# Patient Record
Sex: FEMALE | Race: WHITE | Employment: FULL TIME | URBAN - METROPOLITAN AREA
[De-identification: names, ages, dates, MRNs, and addresses within clinical notes are randomized per-mention and may not be internally consistent; named-entity substitution may affect disease eponyms.]

---

## 2017-01-22 DIAGNOSIS — Z30.011 ENCOUNTER FOR INITIAL PRESCRIPTION OF CONTRACEPTIVE PILLS: Primary | ICD-10-CM

## 2017-01-22 NOTE — TELEPHONE ENCOUNTER
norgestimate-ethinyl estradiol (ORTHO-CYCLEN, SPRINTEC) 0.25-35 MG-MCG per tablet      Last Written Prescription Date: 02-  Last Fill Quantity: 84,  # refills: 3   Last Office Visit with FMFABIOLA, NAN or [facility] prescribing provider: 11-

## 2017-01-23 RX ORDER — NORGESTIMATE AND ETHINYL ESTRADIOL 0.25-0.035
1 KIT ORAL DAILY
Qty: 28 TABLET | Refills: 1 | Status: SHIPPED | OUTPATIENT
Start: 2017-01-23 | End: 2017-02-02

## 2017-02-02 ENCOUNTER — OFFICE VISIT (OUTPATIENT)
Dept: PEDIATRICS | Facility: CLINIC | Age: 22
End: 2017-02-02
Payer: COMMERCIAL

## 2017-02-02 VITALS
BODY MASS INDEX: 20.83 KG/M2 | WEIGHT: 125 LBS | TEMPERATURE: 98 F | HEIGHT: 65 IN | OXYGEN SATURATION: 99 % | SYSTOLIC BLOOD PRESSURE: 102 MMHG | DIASTOLIC BLOOD PRESSURE: 60 MMHG | HEART RATE: 65 BPM

## 2017-02-02 DIAGNOSIS — Z30.011 ENCOUNTER FOR INITIAL PRESCRIPTION OF CONTRACEPTIVE PILLS: ICD-10-CM

## 2017-02-02 DIAGNOSIS — Z11.3 SCREENING EXAMINATION FOR SEXUALLY TRANSMITTED DISEASE: ICD-10-CM

## 2017-02-02 DIAGNOSIS — Z00.00 ROUTINE GENERAL MEDICAL EXAMINATION AT A HEALTH CARE FACILITY: Primary | ICD-10-CM

## 2017-02-02 LAB
CHOLEST SERPL-MCNC: 147 MG/DL
HDLC SERPL-MCNC: 82 MG/DL
LDLC SERPL CALC-MCNC: 46 MG/DL
NONHDLC SERPL-MCNC: 65 MG/DL
TRIGL SERPL-MCNC: 93 MG/DL

## 2017-02-02 PROCEDURE — 99395 PREV VISIT EST AGE 18-39: CPT | Mod: GC | Performed by: INTERNAL MEDICINE

## 2017-02-02 PROCEDURE — 87491 CHLMYD TRACH DNA AMP PROBE: CPT | Performed by: PEDIATRICS

## 2017-02-02 PROCEDURE — 80061 LIPID PANEL: CPT | Performed by: PEDIATRICS

## 2017-02-02 PROCEDURE — 36415 COLL VENOUS BLD VENIPUNCTURE: CPT | Performed by: PEDIATRICS

## 2017-02-02 PROCEDURE — 87591 N.GONORRHOEAE DNA AMP PROB: CPT | Performed by: PEDIATRICS

## 2017-02-02 RX ORDER — NORGESTIMATE AND ETHINYL ESTRADIOL 0.25-0.035
1 KIT ORAL DAILY
Qty: 84 TABLET | Refills: 3 | Status: SHIPPED | OUTPATIENT
Start: 2017-02-02 | End: 2017-06-20

## 2017-02-02 NOTE — PROGRESS NOTES
"   SUBJECTIVE:     CC: Lynne Cortez is an 21 year old woman who presents for preventive health visit.     Healthy Habits:    Do you get at least three servings of calcium containing foods daily (dairy, green leafy vegetables, etc.)? yes    Amount of exercise or daily activities, outside of work: swims occassionally    Problems taking medications regularly: No    Medication side effects: No - only regular medication is OCP, no side effects, no concerns taking it on time    Have you had an eye exam in the past two years? No. Has never worn glasses, no vision concerns.     Do you see a dentist twice per year? yes  Do you have sleep apnea, excessive snoring or daytime drowsiness? No        Today's PHQ-2 Score:   PHQ-2 ( 1999 Pfizer) 11/19/2015 11/22/2013   Q1: Little interest or pleasure in doing things 0 0   Q2: Feeling down, depressed or hopeless 0 0   PHQ-2 Score 0 0       Abuse: Current or Past(Physical, Sexual or Emotional)- No  Do you feel safe in your environment - Yes    Social History   Substance Use Topics     Smoking status: Never Smoker      Smokeless tobacco: Never Used     Alcohol Use: No     The patient does not drink >3 drinks per day nor >7 drinks per week.    No results for input(s): CHOL, HDL, LDL, TRIG, CHOLHDLRATIO, NHDL in the last 91021 hours.    Reviewed orders with patient.  Reviewed health maintenance and updated orders accordingly - Yes    Mammo Decision Support:  Mammogram not appropriate for this patient based on age.    Pertinent mammograms are reviewed under the imaging tab.  History of abnormal Pap smear: NO - age 21-29 PAP every 3 years recommended  All Histories reviewed and updated in Epic.      ROS:  C: NEGATIVE for fever, chills, change in weight  I: NEGATIVE for worrisome rashes. Wants \"mole\" checked on back, gets rubbed on by bras and clothes sometimes. No change in size, color, or borders or any moles.   E: NEGATIVE for vision changes or irritation  ENT: NEGATIVE for ear, " "mouth and throat problems  R: NEGATIVE for significant cough or SOB  B: NEGATIVE for masses, tenderness or discharge  CV: NEGATIVE for chest pain, palpitations or peripheral edema  GI: NEGATIVE for nausea, abdominal pain, or change in bowel habits  : NEGATIVE for unusual urinary or vaginal symptoms. Periods are regular, on OCPs.  M: NEGATIVE for significant arthralgias or myalgia  N: NEGATIVE for weakness, dizziness or paresthesias  P: NEGATIVE for changes in mood or affect    Problem list, Medication list, Allergies, and Medical/Social/Surgical histories reviewed in [facility] and updated as appropriate.  OBJECTIVE:     /60 mmHg  Pulse 65  Temp(Src) 98  F (36.7  C) (Oral)  Ht 5' 4.75\" (1.645 m)  Wt 125 lb (56.7 kg)  BMI 20.95 kg/m2  SpO2 99%  EXAM:  GENERAL: healthy, alert and no distress  EYES: Eyes grossly normal to inspection, PERRL and conjunctivae and sclerae normal  HENT: nose and mouth without ulcers or lesions  NECK: no adenopathy, no asymmetry, masses, or scars and thyroid normal to palpation  RESP: lungs clear to auscultation - no rales, rhonchi or wheezes  BREAST: normal without masses, tenderness or nipple discharge and no palpable axillary masses or adenopathy  CV: regular rate and rhythm, normal S1 S2, no S3 or S4, no murmur, click or rub, no peripheral edema and peripheral pulses strong  ABDOMEN: soft, nontender, no hepatosplenomegaly, no masses and bowel sounds normal  MS: no gross musculoskeletal defects noted, no edema  SKIN: A benign seborrheic keratosis in mid upper back between scapulas that patient says occasionally bleeds, no ulceration or scabbing currently. 3 mm x 5 mm oval brown macule without any irregular edge, ulceration, discoloration, or other concerning features.   NEURO: Normal strength and tone, mentation intact and speech normal  PSYCH: mentation appears normal, affect normal/bright    ASSESSMENT/PLAN:     (Z00.00) Routine general medical examination at [facility]" "facility  (primary encounter diagnosis)  Comment: Denies wanting flu shot.  Sexually active, ok with GC/chlamydia screen.  No concerns for depression/anxiety. Has never had lipids screened, will do one time screen now, further checks based on results.  UTD on pap.  No need for mammogram at this time based on age.   Plan: Lipid Profile with reflex to direct LDL,         Chlamydia trachomatis PCR, Neisseria         gonorrhoeae PCR    (Z30.011) Encounter for initial prescription of contraceptive pills  Comment: Will change rx to 3 months with 3 refills.  Patient does have family h/o CVA in mom at age 48 years old, she had a PFO, patient unsure about whether or not she was tested or positive for hypecoaguable state.  She will check with her mom and let us know if she has a clotting disorder so we can rediscuss birth control options.  No other contraindications for patient herself.   Plan: norgestimate-ethinyl estradiol (ORTHO-CYCLEN,         SPRINTEC) 0.25-35 MG-MCG per tablet    (Z11.3) Screening examination for sexually transmitted disease  Comment: No prior positive STD screen in the past.   Plan: Chlamydia trachomatis PCR, Neisseria         gonorrhoeae PCR      COUNSELING:   Reviewed preventive health counseling, as reflected in patient instructions       Regular exercise       Healthy diet/nutrition       Vision screening       Contraception       Osteoporosis Prevention/Bone Health       Safe sex practices/STD prevention         reports that she has never smoked. She has never used smokeless tobacco.    Estimated body mass index is 20.95 kg/(m^2) as calculated from the following:    Height as of this encounter: 5' 4.75\" (1.645 m).    Weight as of this encounter: 125 lb (56.7 kg).       Counseling Resources:  ATP IV Guidelines  Pooled Cohorts Equation Calculator  Breast Cancer Risk Calculator  FRAX Risk Assessment  ICSI Preventive Guidelines  Dietary Guidelines for Americans, 2010  USDA's MyPlate  ASA " Prophylaxis  Lung CA Screening    Staffed with Dr. Lyon, attending    Loren Roa MD  Med/Peds PGY-4  [facility]    Attestation:    I have seen above patient and reviewed the documentation from Dr. Roa and discussed the findings with Dr. Roa. I agree with the documentation of Dr. Roa.    Lennie Lyon MD  Internal Medicine/Pediatrics  [facility]

## 2017-02-02 NOTE — PATIENT INSTRUCTIONS
Preventive Health Recommendations  Female Ages 18 to 25     Yearly exam:     See your health care provider every year in order to  o Review health changes.   o Discuss preventive care.    o Review your medicines if your doctor has prescribed any.      You should be tested each year for STDs (sexually transmitted diseases).       After age 20, talk to your provider about how often you should have cholesterol testing.      Starting at age 21, get a Pap test every three years. If you have an abnormal result, your doctor may have you test more often.      If you are at risk for diabetes, you should have a diabetes test (fasting glucose).     Shots:     Get a flu shot each year.     Get a tetanus shot every 10 years.     Consider getting the shot (vaccine) that prevents cervical cancer (Gardasil).    Nutrition:     Eat at least 5 servings of fruits and vegetables each day.    Eat whole-grain bread, whole-wheat pasta and brown rice instead of white grains and rice.    Talk to your provider about Calcium and Vitamin D.     Lifestyle    Exercise at least 150 minutes a week each week (30 minutes a day, 5 days a week). This will help you control your weight and prevent disease.  This is also good for keeping your bones strong as this is the time to maintain bone strength.      Limit alcohol to one drink per day.    No smoking.     Wear sunscreen to prevent skin cancer.    See your dentist every six months for an exam and cleaning.    Wear sunglasses when outside year round.     I will call with results of the gonorrhea and chlamydia screening tests.  These will be back Friday or Monday.

## 2017-02-02 NOTE — MR AVS SNAPSHOT
After Visit Summary   2/2/2017    Lynne Cortez    MRN: [id number]           Patient Information     Date Of Birth          1995        Visit Information        Provider Department      2/2/2017 8:45 AM Loren Roa MD [facility]        Today's Diagnoses     Routine general medical examination at a health care facility    -  1     Encounter for initial prescription of contraceptive pills         Screening examination for sexually transmitted disease           Care Instructions      Preventive Health Recommendations  Female Ages 18 to 25     Yearly exam:     See your health care provider every year in order to  o Review health changes.   o Discuss preventive care.    o Review your medicines if your doctor has prescribed any.      You should be tested each year for STDs (sexually transmitted diseases).       After age 20, talk to your provider about how often you should have cholesterol testing.      Starting at age 21, get a Pap test every three years. If you have an abnormal result, your doctor may have you test more often.      If you are at risk for diabetes, you should have a diabetes test (fasting glucose).     Shots:     Get a flu shot each year.     Get a tetanus shot every 10 years.     Consider getting the shot (vaccine) that prevents cervical cancer (Gardasil).    Nutrition:     Eat at least 5 servings of fruits and vegetables each day.    Eat whole-grain bread, whole-wheat pasta and brown rice instead of white grains and rice.    Talk to your provider about Calcium and Vitamin D.     Lifestyle    Exercise at least 150 minutes a week each week (30 minutes a day, 5 days a week). This will help you control your weight and prevent disease.  This is also good for keeping your bones strong as this is the time to maintain bone strength.      Limit alcohol to one drink per day.    No smoking.     Wear sunscreen to prevent skin cancer.    See your dentist every six months for  "an exam and cleaning.    Wear sunglasses when outside year round.     I will call with results of the gonorrhea and chlamydia screening tests.  These will be back Friday or Monday.          Follow-ups after your visit        Who to contact     If you have questions or need follow up information about today's clinic visit or your schedule please contact [facility] [place] directly at [phone].  Normal or non-critical lab and imaging results will be communicated to you by Lenovohart, letter or phone within 4 business days after the clinic has received the results. If you do not hear from us within 7 days, please contact the clinic through Clupediat or phone. If you have a critical or abnormal lab result, we will notify you by phone as soon as possible.  Submit refill requests through [employer]ix or call your pharmacy and they will forward the refill request to us. Please allow 3 business days for your refill to be completed.          Additional Information About Your Visit        [employer] Information     S[employer] gives you secure access to your electronic health record. If you see a primary care provider, you can also send messages to your care team and make appointments. If you have questions, please call your primary care clinic.  If you do not have a primary care provider, please call [phone] and they will assist you.        Care EveryWhere ID     This is your Care EveryWhere ID. This could be used by other organizations to access your [place] medical records  [id number]        Your Vitals Were     Pulse Temperature Height BMI (Body Mass Index) Pulse Oximetry       65 98  F (36.7  C) (Oral) 5' 4.75\" (1.645 m) 20.95 kg/m2 99%        Blood Pressure from Last 3 Encounters:   02/02/17 102/60   11/19/15 107/68   05/15/15 108/64    Weight from Last 3 Encounters:   02/02/17 125 lb (56.7 kg)   11/19/15 126 lb (57.153 kg)   05/15/15 118 lb (53.524 kg)              We Performed the Following     Chlamydia trachomatis " PCR     Lipid Profile with reflex to direct LDL     Neisseria gonorrhoeae PCR          Today's Medication Changes          These changes are accurate as of: 2/2/17  9:02 AM.  If you have any questions, ask your nurse or doctor.               Stop taking these medicines if you haven't already. Please contact your care team if you have questions.     amphetamine-dextroamphetamine 10 MG per tablet   Commonly known as:  ADDERALL   Stopped by:  Loren Roa MD                Where to get your medicines      These medications were sent to [facility]/pharmacy #[id number] - [place], [place] - [address] AT [place] [address], [place] [address]     Phone:  [phone]    - norgestimate-ethinyl estradiol 0.25-35 MG-MCG per tablet             Primary Care Provider Office Phone # Fax #    Irina Navarrete [phone]       [facility] [address] [place] [address]        Thank you!     Thank you for choosing [facility]  for your care. Our goal is always to provide you with excellent care. Hearing back from our patients is one way we can continue to improve our services. Please take a few minutes to complete the written survey that you may receive in the mail after your visit with us. Thank you!             Your Updated Medication List - Protect others around you: Learn how to safely use, store and throw away your medicines at www.disposemymeds.org.          This list is accurate as of: 2/2/17  9:02 AM.  Always use your most recent med list.                   Brand Name Dispense Instructions for use    clobetasol 0.05 % ointment    TEMOVATE     Apply topically daily as needed For hands       norgestimate-ethinyl estradiol 0.25-35 MG-MCG per tablet    ORTHO-CYCLEN, SPRINTEC    84 tablet    Take 1 tablet by mouth daily

## 2017-02-03 LAB
C TRACH DNA SPEC QL NAA+PROBE: NORMAL
N GONORRHOEA DNA SPEC QL NAA+PROBE: NORMAL
SPECIMEN SOURCE: NORMAL
SPECIMEN SOURCE: NORMAL

## 2017-02-25 ENCOUNTER — TELEPHONE (OUTPATIENT)
Dept: NURSING | Facility: CLINIC | Age: 22
End: 2017-02-25

## 2017-02-25 NOTE — TELEPHONE ENCOUNTER
"Call Type: Triage Call    Presenting Problem: \"I am experiencing a fluttering in my chest for  the past 4 days.\" Patient reporting episodes are brief lasting a few  seconds. Denies other symptoms. Patient stating she has not counted  her heart rate during episodes. Denies any current symptoms during  triage call.  Triage Note:  Guideline Title: Irregular Heartbeat  Recommended Disposition: See Provider within 2 Weeks  Original Inclination: Did not know what to do  Override Disposition:  Intended Action: Follow advice given  Physician Contacted: No  All other situations ?  YES  Loss of consciousness for any period of time ? NO  New or worsening signs and symptoms that may indicate shock ? NO  Signs of dehydration and pulse rate at rest greater than 100 beats/minute ? NO  Currently having palpitations/irregular heartbeats AND severe breathing problems ?  NO  Has a pacemaker AND new symptoms of decreased cardiac output (episode of feeling  faint, dizzy or fatigue, sudden slowing of pulse, or shortness of breath) ? NO  Persistent palpitations more than 1 hour with no other signs and symptoms. ? NO  Any other cardiac signs/symptoms for more than 5 minutes, now or within last hour.  Pain is NOT associated with taking a deep breath or a productive cough, movement,  or touch to a localized area on the chest or upper body. ? NO  Known heart failure (HF) and new onset of palpitations or irregular pulse ? NO  Recently ingested or used stimulants or drugs AND pulse rate is suddenly irregular  or more than 120 beats/minute at rest for 30 minutes or more ? NO  Alcohol binging within last 24 hours AND pulse rate is suddenly irregular or  suddenly more than 120 beats/minute at rest for 30 minutes or more after stopping  alcohol intake ? NO  Sudden onset of rapid pulse (greater than 120 beats/minute at rest) OR slow pulse  (less than 50 beats per minute at rest) with heat exposure (high temperature,  high humidity, strenuous " exercise) ? NO  Evaluated by provider and has question/concern about their condition, treatment  plan, treatment options, follow-up appointments, or other follow-up care. ? NO  New onset of rapid pulse (greater than 120 beats/minute at rest) OR slow pulse  (less than 50 beats per minute at rest) ? NO  Pulse rate is suddenly more irregular or suddenly more than 100 beats/minute at  rest AND known coronary artery disease (history of myocardial infraction (MI),  angina or cardiac surgery) ? NO  Known heart valve disease AND pulse rate is suddenly more irregular or suddenly  more than 100 beats/minute at rest ? NO  Known pulmonary hypertension AND pulse rate is suddenly more irregular or suddenly  more than 100 beats/minute at rest ? NO  Symptoms began after changing dose or starting new prescription, nonprescription,  or alternative medication AND now has an irregular pulse or pulse rate more than  100 beats/minute at rest ? NO  Recently stopped taking prescription medication AND now has new irregular pulse or  pulse rate more than 100, or less than 50, beats/minute at rest ? NO  Chronic obstructive pulmonary disease (COPD) or asthma AND has used more  medication than usual AND pulse rate is suddenly more than 100 beats/minute or is  irregular for 30 minutes or more ? NO  Taking digitalis, diuretics, beta blockers, calcium channel blockers or thyroid  medications AND continues to have ongoing or repeated episodes of irregular pulse  or pulse more than 100, or less than 50, beats/minute at rest ? NO  Decrease in activity or exercise ability AND ongoing or repeated episodes of  irregular pulse or pulse rate more than 100 beats/minute at rest ? NO  New or worsening swelling of lower extremity(ies) AND irregular pulse or pulse  rate more than 100 beats/minute at rest ? NO  Temperature elevation, weakness, or muscle aches with rapid heart rate more than  100 beats/minute at rest AND not responsive to 24 hours of home care ?  NO  Persistent rapid pulse rate more than 100 beats per minute AND unusual sweating or  unexplained weight loss that has not been previously evaluated ? NO  Menopausal (with or without prescription for hormone replacement therapy) AND new  onset of intermittent episodes of irregular pulse or pulse rate more than 100  beats/minute at rest ? NO  Unintentional weight loss AND pulse rate is more than 100, or less than 50  beats/minute at rest or is irregular ? NO  Unexplained nervousness or irritability AND pulse rate is more than 100  beats/minute at rest or is irregular ? NO  Sleep problems AND pulse rate is more than 100 beats/minute at rest or is  irregular ? NO  Pulse rate is more than 100 beats/minute at rest or is irregular following or  occurring with alcohol use AND no symptoms between episodes of alcohol use ? NO  Heat exposure (high temperature, high humidity, strenuous activity) AND pulse is  more than 100 beats/minute at rest or irregular pulse ? NO  Smoked while using nicotine patch or gum AND pulse rate is more than 100  beats/minute at rest or is irregular ? NO  Pulse rate is more than 100 beats/minute at rest or is irregular following use of  caffeine ? NO  Pulse rate is more than 100, or less than 50  beats/minute at rest or is irregular  AND has been evaluated by provider AND current symptoms are same ? NO  Physician Instructions:  Care Advice: Change position slowly.  Sit for a couple of minutes before  standing to walk.  Quick position changes may cause or worsen symptoms.  Call provider if pulse rate more than 100/minute or irregular pulse  continues for more than 24 hours.  Avoid caffeine (coffee, tea, some soft drinks, some energy drinks, and  chocolate), alcohol, and nicotine (any use of tobacco)  using these substances may worsen symptoms.

## 2017-02-27 ENCOUNTER — OFFICE VISIT (OUTPATIENT)
Dept: FAMILY MEDICINE | Facility: CLINIC | Age: 22
End: 2017-02-27
Payer: COMMERCIAL

## 2017-02-27 VITALS
HEART RATE: 73 BPM | SYSTOLIC BLOOD PRESSURE: 110 MMHG | OXYGEN SATURATION: 99 % | DIASTOLIC BLOOD PRESSURE: 56 MMHG | BODY MASS INDEX: 21.33 KG/M2 | WEIGHT: 128 LBS | HEIGHT: 65 IN | TEMPERATURE: 97.9 F

## 2017-02-27 DIAGNOSIS — R00.2 PALPITATIONS: Primary | ICD-10-CM

## 2017-02-27 PROCEDURE — 99214 OFFICE O/P EST MOD 30 MIN: CPT | Performed by: PHYSICIAN ASSISTANT

## 2017-02-27 PROCEDURE — 93000 ELECTROCARDIOGRAM COMPLETE: CPT | Performed by: PHYSICIAN ASSISTANT

## 2017-02-27 NOTE — NURSING NOTE
"Chief Complaint   Patient presents with     Other     flutter in chest       Initial /56  Pulse 73  Temp 97.9  F (36.6  C) (Oral)  Ht 5' 4.75\" (1.645 m)  Wt 128 lb (58.1 kg)  SpO2 99%  BMI 21.47 kg/m2 Estimated body mass index is 21.47 kg/(m^2) as calculated from the following:    Height as of this encounter: 5' 4.75\" (1.645 m).    Weight as of this encounter: 128 lb (58.1 kg).  Medication Reconciliation: complete   Kolby Espinoza CMA        "

## 2017-02-27 NOTE — MR AVS SNAPSHOT
After Visit Summary   2/27/2017    Lynne Cortez    MRN: [id number]           Patient Information     Date Of Birth          1995        Visit Information        Provider Department      2/27/2017 4:40 PM Adrian Richards PA-C [facility] [place]        Today's Diagnoses     Palpitations    -  1      Care Instructions    You can call [phone] to schedule/set up your Event Monitor          Follow-ups after your visit        Future tests that were ordered for you today     Open Future Orders        Priority Expected Expires Ordered    Cardiac Event Monitor - Peds/Adult Routine  4/13/2017 2/27/2017            Who to contact     If you have questions or need follow up information about today's clinic visit or your schedule please contact [facility] directly at [phone].  Normal or non-critical lab and imaging results will be communicated to you by MyChart, letter or phone within 4 business days after the clinic has received the results. If you do not hear from us within 7 days, please contact the clinic through MyChart or phone. If you have a critical or abnormal lab result, we will notify you by phone as soon as possible.  Submit refill requests through [employer] or call your pharmacy and they will forward the refill request to us. Please allow 3 business days for your refill to be completed.          Additional Information About Your Visit        MyChart Information     [employer] gives you secure access to your electronic health record. If you see a primary care provider, you can also send messages to your care team and make appointments. If you have questions, please call your primary care clinic.  If you do not have a primary care provider, please call [phone] and they will assist you.        Care EveryWhere ID     This is your Care EveryWhere ID. This could be used by other organizations to access your [facility] medical records  [id number]        Your  "Vitals Were     Pulse Temperature Height Pulse Oximetry BMI (Body Mass Index)       73 97.9  F (36.6  C) (Oral) 5' 4.75\" (1.645 m) 99% 21.47 kg/m2        Blood Pressure from Last 3 Encounters:   02/27/17 110/56   02/02/17 102/60   11/19/15 107/68    Weight from Last 3 Encounters:   02/27/17 128 lb (58.1 kg)   02/02/17 125 lb (56.7 kg)   11/19/15 126 lb (57.2 kg)              We Performed the Following     EKG 12-lead complete w/read - Clinics        Primary Care Provider Office Phone # Fax #    Irina Navarrete [phone]       [facility] [address] [place] [address]        Thank you!     Thank you for choosing [facility]  for your care. Our goal is always to provide you with excellent care. Hearing back from our patients is one way we can continue to improve our services. Please take a few minutes to complete the written survey that you may receive in the mail after your visit with us. Thank you!             Your Updated Medication List - Protect others around you: Learn how to safely use, store and throw away your medicines at www.disposemymeds.org.          This list is accurate as of: 2/27/17  5:36 PM.  Always use your most recent med list.                   Brand Name Dispense Instructions for use    clobetasol 0.05 % ointment    TEMOVATE     Apply topically daily as needed Reported on 2/27/2017       norgestimate-ethinyl estradiol 0.25-35 MG-MCG per tablet    ORTHO-CYCLEN, SPRINTEC    84 tablet    Take 1 tablet by mouth daily         "

## 2017-02-27 NOTE — PROGRESS NOTES
"  SUBJECTIVE:                                                    Lynne Cortez is a 21 year old female who presents to clinic today for the following health issues:    Chest flutters      Duration: 1 week    Description (location/character/radiation): center chest    Intensity:  mild    Accompanying signs and symptoms: slight discomfort started today, not constant, goes awy when coughing, occurring about 15 times per day lasting just a few seconds    History (similar episodes/previous evaluation): None    Precipitating or alleviating factors: None    Therapies tried and outcome: None     -Patient is a 22yo female with a recent hx of heart sensations  -the symptoms will occur 10-15 times/day; last around 10 seconds  -she does not note any other symptoms when they occur   -no shortness of breath   -she does not smoke  -does drink caffeine, only one coffee in the morning and a pop during the day  -mom has had stroke in 2012 (under age 50) due to \"hole in her heart\" (suspect pfo)  -she denies any hx of anxiety  -denies any recent dieting or initiation of supplements    Problem list and histories reviewed & adjusted, as indicated.  Additional history: as documented    Patient Active Problem List   Diagnosis     ADD (attention deficit disorder) without hyperactivity     History reviewed. No pertinent past surgical history.    Social History   Substance Use Topics     Smoking status: Never Smoker     Smokeless tobacco: Never Used     Alcohol use 0.0 oz/week     0 Standard drinks or equivalent per week      Comment: occ     Family History   Problem Relation Age of Onset     CEREBROVASCULAR DISEASE Mother      Lipids Mother      Hypertension Father      Lipids Maternal Grandmother      Lipids Maternal Grandfather            ROS:  Constitutional, HEENT, cardiovascular, pulmonary, gi and gu systems are negative, except as otherwise noted.    OBJECTIVE:                                                    /56  Pulse 73 " " Temp 97.9  F (36.6  C) (Oral)  Ht 5' 4.75\" (1.645 m)  Wt 128 lb (58.1 kg)  SpO2 99%  BMI 21.47 kg/m2  Body mass index is 21.47 kg/(m^2).  GENERAL: healthy, alert and no distress  EYES: Eyes grossly normal to inspection, PERRL and conjunctivae and sclerae normal  NECK: no adenopathy and thyroid normal to palpation  RESP: lungs clear to auscultation - no rales, rhonchi or wheezes  CV: regular rate and rhythm, normal S1 S2, no S3 or S4, no murmur, click or rub, no peripheral edema and peripheral pulses strong  MS: no gross musculoskeletal defects noted, no edema  PSYCH: mentation appears normal, affect normal/bright    Diagnostic Test Results:  EKG - unremarkable     ASSESSMENT/PLAN:                                                    1. Palpitations  Unclear etiology for new onset. Her EKG looked good today. Sinus in clinic today. Given her symptoms will have her get an event monitor to capture these symptomatic moments. Suspect simple pvc/pac but will have her see cardiology as needed. Did  on caffeine and etoh intake.   - EKG 12-lead complete w/read - Clinics  - Cardiac Event Monitor - Peds/Adult; Future    Adrian Richards PA-C  [facility]    "

## 2017-03-07 ENCOUNTER — HOSPITAL ENCOUNTER (OUTPATIENT)
Dept: CARDIOLOGY | Facility: CLINIC | Age: 22
Discharge: HOME | End: 2017-03-07
Attending: PHYSICIAN ASSISTANT | Admitting: PHYSICIAN ASSISTANT
Payer: COMMERCIAL

## 2017-03-07 DIAGNOSIS — R00.2 PALPITATIONS: ICD-10-CM

## 2017-03-07 PROCEDURE — 93270 REMOTE 30 DAY ECG REV/REPORT: CPT

## 2017-03-07 PROCEDURE — 93272 ECG/REVIEW INTERPRET ONLY: CPT | Performed by: INTERNAL MEDICINE

## 2017-06-20 ENCOUNTER — OFFICE VISIT (OUTPATIENT)
Dept: PEDIATRICS | Facility: CLINIC | Age: 22
End: 2017-06-20
Payer: COMMERCIAL

## 2017-06-20 ENCOUNTER — TELEPHONE (OUTPATIENT)
Dept: OBGYN | Facility: CLINIC | Age: 22
End: 2017-06-20

## 2017-06-20 VITALS
OXYGEN SATURATION: 98 % | WEIGHT: 129 LBS | HEART RATE: 84 BPM | BODY MASS INDEX: 21.49 KG/M2 | SYSTOLIC BLOOD PRESSURE: 98 MMHG | TEMPERATURE: 97.9 F | HEIGHT: 65 IN | DIASTOLIC BLOOD PRESSURE: 56 MMHG

## 2017-06-20 DIAGNOSIS — Z30.430 ENCOUNTER FOR IUD INSERTION: Primary | ICD-10-CM

## 2017-06-20 DIAGNOSIS — Z30.011 ENCOUNTER FOR INITIAL PRESCRIPTION OF CONTRACEPTIVE PILLS: ICD-10-CM

## 2017-06-20 PROCEDURE — 87491 CHLMYD TRACH DNA AMP PROBE: CPT | Performed by: NURSE PRACTITIONER

## 2017-06-20 PROCEDURE — 99213 OFFICE O/P EST LOW 20 MIN: CPT | Performed by: NURSE PRACTITIONER

## 2017-06-20 RX ORDER — MISOPROSTOL 200 UG/1
TABLET ORAL
Qty: 2 TABLET | Refills: 0 | Status: SHIPPED | OUTPATIENT
Start: 2017-06-20 | End: 2019-04-18

## 2017-06-20 RX ORDER — NORGESTIMATE AND ETHINYL ESTRADIOL 0.25-0.035
1 KIT ORAL DAILY
Qty: 56 TABLET | Refills: 0 | Status: SHIPPED | OUTPATIENT
Start: 2017-06-20 | End: 2019-04-18

## 2017-06-20 NOTE — TELEPHONE ENCOUNTER
Patient scheduled for IIUD insertion 7/19/17.  Would you like to send RX for Cytotec to pharmacy?  Thank you  Kiya Lion RN

## 2017-06-20 NOTE — NURSING NOTE
"Chief Complaint   Patient presents with     Contraception       Initial BP 98/56 (Cuff Size: Adult Regular)  Pulse 84  Temp 97.9  F (36.6  C) (Tympanic)  Ht 5' 4.75\" (1.645 m)  Wt 129 lb (58.5 kg)  LMP 06/01/2017 (Approximate)  SpO2 98%  BMI 21.63 kg/m2 Estimated body mass index is 21.63 kg/(m^2) as calculated from the following:    Height as of this encounter: 5' 4.75\" (1.645 m).    Weight as of this encounter: 129 lb (58.5 kg).  Medication Reconciliation: complete   Aminah Carr CMA    "

## 2017-06-20 NOTE — MR AVS SNAPSHOT
After Visit Summary   6/20/2017    Lynne Cortez    MRN: [id number]           Patient Information     Date Of Birth          1995        Visit Information        Provider Department      6/20/2017 9:00 AM Lennie Sandoval APRN [facility]NP [facility]        Today's Diagnoses     Encounter for initial prescription of contraceptive pills          Care Instructions    -Continue birth control pills. Just follow the instructions regardless of when you're bleeding  -Schedule IUD insertion  -Let me know who you have an appointment with so I can contact them          Follow-ups after your visit        Additional Services     OB/GYN REFERRAL       Your provider has referred you to:  [facility]: [facility] [phone]   [email or web address]    Please be aware that coverage of these services is subject to the terms and limitations of your health insurance plan.  Call member services at your health plan with any benefit or coverage questions.      Please bring the following with you to your appointment:    (1) Any X-Rays, CTs or MRIs which have been performed.  Contact the facility where they were done to arrange for  prior to your scheduled appointment.   (2) List of current medications   (3) This referral request   (4) Any documents/labs given to you for this referral                  Who to contact     If you have questions or need follow up information about today's clinic visit or your schedule please contact [facility] directly at [phone].  Normal or non-critical lab and imaging results will be communicated to you by MyChart, letter or phone within 4 business days after the clinic has received the results. If you do not hear from us within 7 days, please contact the clinic through MyChart or phone. If you have a critical or abnormal lab result, we will notify you by phone as soon as possible.  Submit refill requests through mobilePeople  "or call your pharmacy and they will forward the refill request to us. Please allow 3 business days for your refill to be completed.          Additional Information About Your Visit        MyChart Information     [employer] gives you secure access to your electronic health record. If you see a primary care provider, you can also send messages to your care team and make appointments. If you have questions, please call your primary care clinic.  If you do not have a primary care provider, please call [phone] and they will assist you.        Care EveryWhere ID     This is your Care EveryWhere ID. This could be used by other organizations to access your [place] medical records  [id number]        Your Vitals Were     Pulse Temperature Height Last Period Pulse Oximetry BMI (Body Mass Index)    84 97.9  F (36.6  C) (Tympanic) 5' 4.75\" (1.645 m) 06/01/2017 (Approximate) 98% 21.63 kg/m2       Blood Pressure from Last 3 Encounters:   06/20/17 98/56   02/27/17 110/56   02/02/17 102/60    Weight from Last 3 Encounters:   06/20/17 129 lb (58.5 kg)   02/27/17 128 lb (58.1 kg)   02/02/17 125 lb (56.7 kg)              We Performed the Following     Chlamydia trachomatis PCR     OB/GYN REFERRAL          Where to get your medicines      These medications were sent to [facility]/pharmacy #[id number] - [place], [place] - [address] [place], [place] [address]     Phone:  [phone]     norgestimate-ethinyl estradiol 0.25-35 MG-MCG per tablet          Primary Care Provider Office Phone # Fax #    Irina Navarrete [phone]       [facility] [address] [place] [address]        Thank you!     Thank you for choosing [facility]  for your care. Our goal is always to provide you with excellent care. Hearing back from our patients is one way we can continue to improve our services. Please take a few minutes to complete the written survey " that you may receive in the mail after your visit with us. Thank you!             Your Updated Medication List - Protect others around you: Learn how to safely use, store and throw away your medicines at www.disposemymeds.org.          This list is accurate as of: 6/20/17  9:38 AM.  Always use your most recent med list.                   Brand Name Dispense Instructions for use    norgestimate-ethinyl estradiol 0.25-35 MG-MCG per tablet    ORTHO-CYCLEN, SPRINTEC    56 tablet    Take 1 tablet by mouth daily

## 2017-06-20 NOTE — TELEPHONE ENCOUNTER
----- Message from Vanessa Mcallister sent at 6/20/2017  2:48 PM CDT -----  Regarding: IUD  Contact: [phone]  Patient is scheduled for an IUD placement 7/19/17. Any prescriptions can be sent to [facility] [place].    Vanessa Mcallister,   [facility]

## 2017-06-20 NOTE — PROGRESS NOTES
"  SUBJECTIVE:                                                    Lynne Cortez is a 22 year old female who presents to clinic today for the following health issues:    Patient here today for contraception.  Patient states on current birth control (Sprintec) is having breakthrough bleeding - gets period in middle of pack and then sometimes gets again when \"supposed to\".  Also concerned because mother has had 2 strokes - patient states mom \"has hole in her heart and blood clots went to her brain.\".:      ROS: const/gyn/heme otherwise negative     OBJECTIVE:  BP 98/56 (Cuff Size: Adult Regular)  Pulse 84  Temp 97.9  F (36.6  C) (Tympanic)  Ht 5' 4.75\" (1.645 m)  Wt 129 lb (58.5 kg)  LMP 06/01/2017 (Approximate)  SpO2 98%  BMI 21.63 kg/m2  CONSTITUTIONAL: Alert, well-nourished, well-groomed, NAD  RESP: Lungs CTA. No wheeze, rhonchi, rales.  CV: HRRR S1 S2 No MRG. No peripheral edema      ASSESSMENT/PLAN:  (Z30.011) Encounter for initial prescription of contraceptive pills  Comment: Patient already on OCPs. However, her mom has a hx of stroke due to clots in hear heart, has not had clotting testing. In addition, patient getting spotting on the pill. Given mom's stroke history, patient is interested in IUD instead of estrogen containing methods.   Plan: norgestimate-ethinyl estradiol (ORTHO-CYCLEN,         SPRINTEC) 0.25-35 MG-MCG per tablet, OB/GYN         REFERRAL, Chlamydia trachomatis PCR        Discussed risks and benefits of IUD. Discussed 3-4 months of spotting to be expected. Chlamydia testing today. Refer to OB. Continue pills for another 2 months until able to see OB.       Lennie Sandoval, FNP-DNP.        "

## 2017-06-20 NOTE — PATIENT INSTRUCTIONS
-Continue birth control pills. Just follow the instructions regardless of when you're bleeding  -Schedule IUD insertion  -Let me know who you have an appointment with so I can contact them

## 2017-06-21 ENCOUNTER — MYC MEDICAL ADVICE (OUTPATIENT)
Dept: PEDIATRICS | Facility: CLINIC | Age: 22
End: 2017-06-21

## 2017-06-21 LAB
C TRACH DNA SPEC QL NAA+PROBE: NORMAL
SPECIMEN SOURCE: NORMAL

## 2017-06-22 NOTE — TELEPHONE ENCOUNTER
DEO message for Dr. Bush.    Pt. Had received Chlamydia testing in february. Please see mychart response.    Urmila BRAR RN, BSN, PHN  [facility] RN

## 2017-06-28 ENCOUNTER — MYC MEDICAL ADVICE (OUTPATIENT)
Dept: PEDIATRICS | Facility: CLINIC | Age: 22
End: 2017-06-28

## 2017-06-28 DIAGNOSIS — N93.9 VAGINAL BLEEDING: Primary | ICD-10-CM

## 2017-06-29 RX ORDER — MEDROXYPROGESTERONE ACETATE 5 MG
5 TABLET ORAL DAILY
Qty: 10 TABLET | Refills: 0 | Status: SHIPPED | OUTPATIENT
Start: 2017-06-29 | End: 2017-07-09

## 2017-06-29 NOTE — TELEPHONE ENCOUNTER
Lennie, would you want to check a Hgb?  Advise an Evisit, or would it be best to see her in clinic?    Discussed with Lennie and she recommends a course of Provera for ten days, and will check hgb.  Patient is scheduled on 07/19 for IUD insertion.  Orders pending patient response.  TATA Lacy RN

## 2017-06-29 NOTE — TELEPHONE ENCOUNTER
Orders placed for hgb check and Provera per Lennie Lori's verbal order.  Sent to Lennie for cosign.  Patient notified thru My Chart.  TATA Lacy RN

## 2017-07-19 ENCOUNTER — OFFICE VISIT (OUTPATIENT)
Dept: OBGYN | Facility: CLINIC | Age: 22
End: 2017-07-19
Payer: COMMERCIAL

## 2017-07-19 VITALS
HEART RATE: 64 BPM | WEIGHT: 129 LBS | DIASTOLIC BLOOD PRESSURE: 50 MMHG | SYSTOLIC BLOOD PRESSURE: 114 MMHG | BODY MASS INDEX: 21.63 KG/M2

## 2017-07-19 DIAGNOSIS — Z30.430 ENCOUNTER FOR IUD INSERTION: Primary | ICD-10-CM

## 2017-07-19 PROBLEM — Z97.5 IUD (INTRAUTERINE DEVICE) IN PLACE: Status: ACTIVE | Noted: 2017-07-19

## 2017-07-19 LAB — BETA HCG QUAL IFA URINE: NEGATIVE

## 2017-07-19 PROCEDURE — 84703 CHORIONIC GONADOTROPIN ASSAY: CPT | Performed by: OBSTETRICS & GYNECOLOGY

## 2017-07-19 PROCEDURE — 58300 INSERT INTRAUTERINE DEVICE: CPT | Performed by: OBSTETRICS & GYNECOLOGY

## 2017-07-19 NOTE — MR AVS SNAPSHOT
After Visit Summary   7/19/2017    Lynne Cortez    MRN: [id number]           Patient Information     Date Of Birth          1995        Visit Information        Provider Department      7/19/2017 10:30 AM Braulio Bush MD [facility]        Today's Diagnoses     Encounter for IUD insertion    -  1       Follow-ups after your visit        Follow-up notes from your care team     Return if symptoms worsen or fail to improve.      Who to contact     If you have questions or need follow up information about today's clinic visit or your schedule please contact [facility] directly at [phone].  Normal or non-critical lab and imaging results will be communicated to you by sMediohart, letter or phone within 4 business days after the clinic has received the results. If you do not hear from us within 7 days, please contact the clinic through sMediohart or phone. If you have a critical or abnormal lab result, we will notify you by phone as soon as possible.  Submit refill requests through [email or web address] or call your pharmacy and they will forward the refill request to us. Please allow 3 business days for your refill to be completed.          Additional Information About Your Visit        MyChart Information     [email or web address] gives you secure access to your electronic health record. If you see a primary care provider, you can also send messages to your care team and make appointments. If you have questions, please call your primary care clinic.  If you do not have a primary care provider, please call [phone] and they will assist you.        Care EveryWhere ID     This is your Care EveryWhere ID. This could be used by other organizations to access your [place] medical records  [id number]        Your Vitals Were     Pulse Last Period BMI (Body Mass Index)             64 06/01/2017 21.63 kg/m2          Blood Pressure from Last 3 Encounters:   07/19/17 114/50   06/20/17 98/56   02/27/17  110/56    Weight from Last 3 Encounters:   07/19/17 129 lb (58.5 kg)   06/20/17 129 lb (58.5 kg)   02/27/17 128 lb (58.1 kg)              We Performed the Following     Beta HCG qual IFA urine     INSERTION INTRAUTERINE DEVICE        Primary Care Provider Office Phone # Fax #    Irina Lana Navarrete [phone]       [facility] [address] [place] [address]        Equal Access to Services     DIOGO LUU : Hadii aad ku hadasho Soomaali, waaxda luqadaha, qaybta kaalmada adeegyada, waxay idiin hayaan adeeg kharaaroldo rosales. So [facility] [phone].    ATENCIÓN: Si habla español, tiene a pastor disposición servicios gratuitos de asistencia lingüística. [facility] [phone].    We comply with applicable federal civil rights laws and [place] laws. We do not discriminate on the basis of race, color, national origin, age, disability sex, sexual orientation or gender identity.            Thank you!     Thank you for choosing [facility] [place]  for your care. Our goal is always to provide you with excellent care. Hearing back from our patients is one way we can continue to improve our services. Please take a few minutes to complete the written survey that you may receive in the mail after your visit with us. Thank you!             Your Updated Medication List - Protect others around you: Learn how to safely use, store and throw away your medicines at www.disposemymeds.org.          This list is accurate as of: 7/19/17 10:57 AM.  Always use your most recent med list.                   Brand Name Dispense Instructions for use Diagnosis    misoprostol 200 MCG tablet    CYTOTEC    2 tablet    Take two tablets po 4 hours before office visit    Encounter for IUD insertion       norgestimate-ethinyl estradiol 0.25-35 MG-MCG per tablet    ORTHO-CYCLEN, SPRINTEC    56 tablet    Take 1 tablet by mouth daily    Encounter for initial prescription of contraceptive pills

## 2017-07-19 NOTE — NURSING NOTE
Consent for IUD insertion obtained and sent to abstracting.  Judith IUD, Zhane, Lot #:   [id number], exp date: 6/18, inserted by Dr. Bush.  Advised to follow up in 6 weeks.  Patient tolerated procedure well.  Kiya Lion RN

## 2017-07-19 NOTE — PROGRESS NOTES
"Chief Complaint   Patient presents with     IUD   here for IUD insertion.    Initial Wt 129 lb (58.5 kg)  LMP 06/01/2017  BMI 21.63 kg/m2 Estimated body mass index is 21.63 kg/(m^2) as calculated from the following:    Height as of 6/20/17: 5' 4.75\" (1.645 m).    Weight as of this encounter: 129 lb (58.5 kg).  Medication Reconciliation: complete   nurse assisted visit.  ADDIE Lion RN    "

## 2017-07-19 NOTE — PROGRESS NOTES
SUBJECTIVE:  Lynne Cortez is an 22 year old  P0 woman who presents for IUD insertion        Past Medical History:   Diagnosis Date     NO ACTIVE PROBLEMS      Past Surgical History:   Procedure Laterality Date     NO HISTORY OF SURGERY       Current Outpatient Prescriptions   Medication     norgestimate-ethinyl estradiol (ORTHO-CYCLEN, SPRINTEC) 0.25-35 MG-MCG per tablet     misoprostol (CYTOTEC) 200 MCG tablet     No current facility-administered medications for this visit.      Allergies   Allergen Reactions     Gold      Confirmed with patch test.      No Clinical Screening - See Comments      Hair dye and the ingredients.        Social History   Substance Use Topics     Smoking status: Never Smoker     Smokeless tobacco: Never Used     Alcohol use 0.0 oz/week     0 Standard drinks or equivalent per week      Comment: 2 times per month, 5-6 per time       Review of Systems  CONSTITUTIONAL:NEGATIVE  EYES: NEGATIVE  ENT/MOUTH: NEGATIVE  RESP: NEGATIVE  CV: NEGATIVE  GI: NEGATIVE  : desires IUD    OBJECTIVE:  /50 (BP Location: Right arm, Cuff Size: Adult Regular)  Pulse 64  Wt 129 lb (58.5 kg)  LMP 2017  BMI 21.63 kg/m2   EXAM:  GENERAL APPEARANCE: healthy, alert and no distress      Procedure Note;    -cervix visualized with lighted speculum    -cervix painted with Betadine    -(DNA screen previously done-negative)    -cervix grasped with tenaculum    -uterus sounds to 7 cm    -Judith placed easily on first attempt    -strings trimmed  All instruments removed for uterus, cervix and vagina      ASSESSMENT:  Desires IUD    PLAN:  (Z30.430) Encounter for IUD insertion  (primary encounter diagnosis)  Plan: Beta HCG qual IFA urine, INSERTION INTRAUTERINE        DEVICE      IUD placed        Health Maintenance   Topic Date Due     INFLUENZA VACCINE (SYSTEM ASSIGNED)  2017     CHLAMYDIA SCREENING  2018     PAP Q3 YR  2018     TETANUS IMMUNIZATION (SYSTEM ASSIGNED)  2024      HPV IMMUNIZATION  Completed

## 2018-07-11 ENCOUNTER — OFFICE VISIT (OUTPATIENT)
Dept: URGENT CARE | Facility: URGENT CARE | Age: 23
End: 2018-07-11
Payer: COMMERCIAL

## 2018-07-11 VITALS
TEMPERATURE: 97.9 F | SYSTOLIC BLOOD PRESSURE: 110 MMHG | WEIGHT: 144.2 LBS | DIASTOLIC BLOOD PRESSURE: 66 MMHG | BODY MASS INDEX: 24.18 KG/M2 | OXYGEN SATURATION: 98 % | HEART RATE: 69 BPM

## 2018-07-11 DIAGNOSIS — H92.03 OTALGIA OF BOTH EARS: ICD-10-CM

## 2018-07-11 DIAGNOSIS — R07.0 THROAT PAIN: ICD-10-CM

## 2018-07-11 DIAGNOSIS — J30.2 ACUTE SEASONAL ALLERGIC RHINITIS, UNSPECIFIED TRIGGER: Primary | ICD-10-CM

## 2018-07-11 LAB
DEPRECATED S PYO AG THROAT QL EIA: NORMAL
SPECIMEN SOURCE: NORMAL

## 2018-07-11 PROCEDURE — 99213 OFFICE O/P EST LOW 20 MIN: CPT | Performed by: PHYSICIAN ASSISTANT

## 2018-07-11 PROCEDURE — 87880 STREP A ASSAY W/OPTIC: CPT | Performed by: PHYSICIAN ASSISTANT

## 2018-07-11 PROCEDURE — 87081 CULTURE SCREEN ONLY: CPT | Performed by: PHYSICIAN ASSISTANT

## 2018-07-11 NOTE — PATIENT INSTRUCTIONS
With distilled water 2-3x per day for a minimum 2-3 days  Mucinex, increased fluids, humidifier at night, steaming in the day  Benadryl (diphenhydramine) at bedtime   Either Claritin (Loratadine), Allegra (Fexofenadine), or Zyrtec (Cetirizine) in the day  Flonase (Fluticasone) 2x each nostril twice a day for two weeks, then once each nostril once a day        Sinusitis (No Antibiotics)    The sinuses are air-filled spaces within the bones of the face. They connect to the inside of the nose. Sinusitis is an inflammation of the tissue that lines the sinuses. Sinusitis can occur during a cold. It can also happen due to allergies to pollens and other particles in the air. It can cause symptoms such as sinus congestion, headache, sore throat, facial swelling, and a feeling of fullness. It may also cause a low-grade fever. Your sinusitis does not include an infection with bacteria. Because of this, antibiotics are not used to treat this problem.  Home care    Drink plenty of water, hot tea, and other liquids. This may help thin nasal mucus. It also may help your sinuses drain fluids.    Heat may help soothe painful areas of your face. Use a towel soaked in hot water. Or,  the shower and direct the warm spray onto your face. Using a vaporizer along with a menthol rub at night may also help soothe symptoms.     An expectorant with guaifenesin may help thin nasal mucus and help your sinuses drain fluids.    You can use an over-the-counter decongestant, unless a similar medicine was prescribed to you. Nasal sprays work the fastest. Use one that contains phenylephrine or oxymetazoline. First blow your nose gently. Then use the spray. Do not use these medicines more often than directed on the label. If you do, your symptoms may get worse. You may also take pills that contain pseudoephedrine. Don t use products that combine multiple medicines. This is because side effects may be increased. Read all medicine labels.  You can also ask the pharmacist for help. (People with high blood pressure should not use decongestants. They can raise blood pressure.)    Over-the-counter antihistamines may help if allergies contributed to your sinusitis.      Use acetaminophen or ibuprofen to control pain, unless another pain medicine was prescribed to you. If you have chronic liver or kidney disease or ever had a stomach ulcer, talk with your healthcare provider before using these medicines. (Aspirin should never be taken by anyone under age 18 who is ill with a fever. It may cause severe liver damage.)    Use nasal rinses or irrigation as instructed by your healthcare provider.    Don't smoke. This can make symptoms worse.  Follow-up care  Follow up with your healthcare provider or our staff if you are better in 1 week.  When to seek medical advice  Call your healthcare provider if any of these occur:    Green or yellow fluid draining from your nose or into your throat    Facial pain or headache that gets worse    Stiff neck    Unusual drowsiness or confusion    Swelling of your forehead or eyelids    Vision problems, such as blurred or double vision    Fever of 100.4 F (38 C) or higher, or as directed by your healthcare provider    Seizure    Breathing problems    Symptoms that don't go away in 10 days  Date Last Reviewed: 11/1/2017 2000-2017 [employer]. [address] [place] [address], [place] [address]. All rights reserved. This information is not intended as a substitute for professional medical care. Always follow your healthcare professional's instructions.

## 2018-07-11 NOTE — PROGRESS NOTES
SUBJECTIVE:  Lynne Cortez is a 23 year old female here with concerns about sinus infection.  She states onset of symptoms were 2 month(s) ago.  She has had maxillary pressure. Course of illness is worsening. Severity moderate  Current and Associated symptoms: nasal congestion, rhinorrhea, cough , sore throat and facial pain/pressure, PND  Predisposing factors include seasonal allergies and bacterial sinusitis. Recent treatment has included: up and up allergy medication daily        Past Medical History:   Diagnosis Date     NO ACTIVE PROBLEMS      Social History   Substance Use Topics     Smoking status: Never Smoker     Smokeless tobacco: Never Used     Alcohol use 0.0 oz/week     0 Standard drinks or equivalent per week      Comment: 2 times per month, 5-6 per time       ROS:  Review of systems negative except as stated above.    OBJECTIVE:  /66 (BP Location: Right arm, Patient Position: Chair, Cuff Size: Adult Regular)  Pulse 69  Temp 97.9  F (36.6  C) (Oral)  Wt 144 lb 3.2 oz (65.4 kg)  SpO2 98%  BMI 24.18 kg/m2  Exam:GENERAL APPEARANCE: healthy, alert and no distress  EYES: EOMI,  PERRL, conjunctiva clear  HENT: ear canals and TM's normal following removal of cerumen impaction by nurse lavage and cureette mechanical removal by provider.  Nose and mouth without ulcers, erythema or lesions  NECK: supple, nontender, no lymphadenopathy  RESP: lungs clear to auscultation - no rales, rhonchi or wheezes  CV: regular rates and rhythm, normal S1 S2, no murmur noted  NEURO: Normal strength and tone, sensory exam grossly normal,  normal speech and mentation  SKIN: no suspicious lesions or rashes    Results for orders placed or performed in visit on 07/11/18   Strep, Rapid Screen   Result Value Ref Range    Specimen Description Throat     Rapid Strep A Screen       NEGATIVE: No Group A streptococcal antigen detected by immunoassay, await culture report.         ASSESSMENT:  (J30.2) Acute seasonal allergic  rhinitis, unspecified trigger  (primary encounter diagnosis)  Plan: See below, follow up PRN    (R07.0) Throat pain  Comment:  Likely due to PND  Plan: Strep, Rapid Screen, Beta strep group A culture      (H92.03) Otalgia of both ears  Comment: cerumen impaction of right ear removed by a combnation of lavage by MA and provider using curette  Plan: avoid QTips               Patient Instructions       With distilled water 2-3x per day for a minimum 2-3 days  Mucinex, increased fluids, humidifier at night, steaming in the day  Benadryl (diphenhydramine) at bedtime   Either Claritin (Loratadine), Allegra (Fexofenadine), or Zyrtec (Cetirizine) in the day  Flonase (Fluticasone) 2x each nostril twice a day for two weeks, then once each nostril once a day        Sinusitis (No Antibiotics)    The sinuses are air-filled spaces within the bones of the face. They connect to the inside of the nose. Sinusitis is an inflammation of the tissue that lines the sinuses. Sinusitis can occur during a cold. It can also happen due to allergies to pollens and other particles in the air. It can cause symptoms such as sinus congestion, headache, sore throat, facial swelling, and a feeling of fullness. It may also cause a low-grade fever. Your sinusitis does not include an infection with bacteria. Because of this, antibiotics are not used to treat this problem.  Home care    Drink plenty of water, hot tea, and other liquids. This may help thin nasal mucus. It also may help your sinuses drain fluids.    Heat may help soothe painful areas of your face. Use a towel soaked in hot water. Or,  the shower and direct the warm spray onto your face. Using a vaporizer along with a menthol rub at night may also help soothe symptoms.     An expectorant with guaifenesin may help thin nasal mucus and help your sinuses drain fluids.    You can use an over-the-counter decongestant, unless a similar medicine was prescribed to you. Nasal sprays work the  fastest. Use one that contains phenylephrine or oxymetazoline. First blow your nose gently. Then use the spray. Do not use these medicines more often than directed on the label. If you do, your symptoms may get worse. You may also take pills that contain pseudoephedrine. Don t use products that combine multiple medicines. This is because side effects may be increased. Read all medicine labels. You can also ask the pharmacist for help. (People with high blood pressure should not use decongestants. They can raise blood pressure.)    Over-the-counter antihistamines may help if allergies contributed to your sinusitis.      Use acetaminophen or ibuprofen to control pain, unless another pain medicine was prescribed to you. If you have chronic liver or kidney disease or ever had a stomach ulcer, talk with your healthcare provider before using these medicines. (Aspirin should never be taken by anyone under age 18 who is ill with a fever. It may cause severe liver damage.)    Use nasal rinses or irrigation as instructed by your healthcare provider.    Don't smoke. This can make symptoms worse.  Follow-up care  Follow up with your healthcare provider or our staff if you are better in 1 week.  When to seek medical advice  Call your healthcare provider if any of these occur:    Green or yellow fluid draining from your nose or into your throat    Facial pain or headache that gets worse    Stiff neck    Unusual drowsiness or confusion    Swelling of your forehead or eyelids    Vision problems, such as blurred or double vision    Fever of 100.4 F (38 C) or higher, or as directed by your healthcare provider    Seizure    Breathing problems    Symptoms that don't go away in 10 days  Date Last Reviewed: 11/1/2017 2000-2017 [employer]. [address] [place] [address], [place] [address]. All rights reserved. This information is not intended as a substitute for professional medical care. Always follow your healthcare  professional's instructions.

## 2018-07-11 NOTE — MR AVS SNAPSHOT
After Visit Summary   7/11/2018    Lynne Cortez    MRN: [id number]           Patient Information     Date Of Birth          1995        Visit Information        Provider Department      7/11/2018 5:15 PM Ru Jose PA-C [facility]        Today's Diagnoses     Throat pain    -  1      Care Instructions        With distilled water 2-3x per day for a minimum 2-3 days  Mucinex, increased fluids, humidifier at night, steaming in the day  Benadryl (diphenhydramine) at bedtime   Either Claritin (Loratadine), Allegra (Fexofenadine), or Zyrtec (Cetirizine) in the day  Flonase (Fluticasone) 2x each nostril twice a day for two weeks, then once each nostril once a day        Sinusitis (No Antibiotics)    The sinuses are air-filled spaces within the bones of the face. They connect to the inside of the nose. Sinusitis is an inflammation of the tissue that lines the sinuses. Sinusitis can occur during a cold. It can also happen due to allergies to pollens and other particles in the air. It can cause symptoms such as sinus congestion, headache, sore throat, facial swelling, and a feeling of fullness. It may also cause a low-grade fever. Your sinusitis does not include an infection with bacteria. Because of this, antibiotics are not used to treat this problem.  Home care    Drink plenty of water, hot tea, and other liquids. This may help thin nasal mucus. It also may help your sinuses drain fluids.    Heat may help soothe painful areas of your face. Use a towel soaked in hot water. Or,  the shower and direct the warm spray onto your face. Using a vaporizer along with a menthol rub at night may also help soothe symptoms.     An expectorant with guaifenesin may help thin nasal mucus and help your sinuses drain fluids.    You can use an over-the-counter decongestant, unless a similar medicine was prescribed to you. Nasal sprays work the fastest. Use one that contains  phenylephrine or oxymetazoline. First blow your nose gently. Then use the spray. Do not use these medicines more often than directed on the label. If you do, your symptoms may get worse. You may also take pills that contain pseudoephedrine. Don t use products that combine multiple medicines. This is because side effects may be increased. Read all medicine labels. You can also ask the pharmacist for help. (People with high blood pressure should not use decongestants. They can raise blood pressure.)    Over-the-counter antihistamines may help if allergies contributed to your sinusitis.      Use acetaminophen or ibuprofen to control pain, unless another pain medicine was prescribed to you. If you have chronic liver or kidney disease or ever had a stomach ulcer, talk with your healthcare provider before using these medicines. (Aspirin should never be taken by anyone under age 18 who is ill with a fever. It may cause severe liver damage.)    Use nasal rinses or irrigation as instructed by your healthcare provider.    Don't smoke. This can make symptoms worse.  Follow-up care  Follow up with your healthcare provider or our staff if you are better in 1 week.  When to seek medical advice  Call your healthcare provider if any of these occur:    Green or yellow fluid draining from your nose or into your throat    Facial pain or headache that gets worse    Stiff neck    Unusual drowsiness or confusion    Swelling of your forehead or eyelids    Vision problems, such as blurred or double vision    Fever of 100.4 F (38 C) or higher, or as directed by your healthcare provider    Seizure    Breathing problems    Symptoms that don't go away in 10 days  Date Last Reviewed: 11/1/2017 2000-2017 [employer]. [address] [place] [address], [place] [address]. All rights reserved. This information is not intended as a substitute for professional medical care. Always follow your healthcare professional's instructions.                 Follow-ups after your visit        Who to contact     If you have questions or need follow up information about today's clinic visit or your schedule please contact [facility] directly at [phone].  Normal or non-critical lab and imaging results will be communicated to you by MyChart, letter or phone within 4 business days after the clinic has received the results. If you do not hear from us within 7 days, please contact the clinic through MyChart or phone. If you have a critical or abnormal lab result, we will notify you by phone as soon as possible.  Submit refill requests through [employer] or call your pharmacy and they will forward the refill request to us. Please allow 3 business days for your refill to be completed.          Additional Information About Your Visit        exoro systemhart Information     [employer] gives you secure access to your electronic health record. If you see a primary care provider, you can also send messages to your care team and make appointments. If you have questions, please call your primary care clinic.  If you do not have a primary care provider, please call [phone] and they will assist you.        Care EveryWhere ID     This is your Care EveryWhere ID. This could be used by other organizations to access your [facility] medical records  [id number]        Your Vitals Were     Pulse Temperature Pulse Oximetry BMI (Body Mass Index)          69 97.9  F (36.6  C) (Oral) 98% 24.18 kg/m2         Blood Pressure from Last 3 Encounters:   07/11/18 110/66   07/19/17 114/50   06/20/17 98/56    Weight from Last 3 Encounters:   07/11/18 144 lb 3.2 oz (65.4 kg)   07/19/17 129 lb (58.5 kg)   06/20/17 129 lb (58.5 kg)              We Performed the Following     Beta strep group A culture     Strep, Rapid Screen        Primary Care Provider Fax #    Physician No Ref-Primary [phone]       No address on file        Equal Access to Services     DIOGO FISHMAN: Chad win  Randall, alda luqadaha, qaybta katanmay winter, yesenia echavarriadiamond bobby. So [facility] [phone].    ATENCIÓN: Si kylee lopez, tiene a pastor disposición servicios gratuitos de asistencia lingüística. Katy al [phone].    We comply with applicable federal civil rights laws and [place] laws. We do not discriminate on the basis of race, color, national origin, age, disability, sex, sexual orientation, or gender identity.            Thank you!     Thank you for choosing [facility]  for your care. Our goal is always to provide you with excellent care. Hearing back from our patients is one way we can continue to improve our services. Please take a few minutes to complete the written survey that you may receive in the mail after your visit with us. Thank you!             Your Updated Medication List - Protect others around you: Learn how to safely use, store and throw away your medicines at www.disposemymeds.org.          This list is accurate as of 7/11/18  5:46 PM.  Always use your most recent med list.                   Brand Name Dispense Instructions for use Diagnosis    misoprostol 200 MCG tablet    CYTOTEC    2 tablet    Take two tablets po 4 hours before office visit    Encounter for IUD insertion       norgestimate-ethinyl estradiol 0.25-35 MG-MCG per tablet    ORTHO-CYCLEN, SPRINTEC    56 tablet    Take 1 tablet by mouth daily    Encounter for initial prescription of contraceptive pills       SAIDA IU           UNABLE TO FIND      Take 1 tablet by mouth daily MEDICATION NAME: Up and Up Allergy

## 2018-07-12 LAB
BACTERIA SPEC CULT: NORMAL
SPECIMEN SOURCE: NORMAL

## 2019-04-08 ENCOUNTER — NURSE TRIAGE (OUTPATIENT)
Dept: NURSING | Facility: CLINIC | Age: 24
End: 2019-04-08
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
## 2019-04-18 ENCOUNTER — OFFICE VISIT (OUTPATIENT)
Dept: PEDIATRICS | Facility: CLINIC | Age: 24
End: 2019-04-18
Payer: COMMERCIAL

## 2019-04-18 VITALS
BODY MASS INDEX: 23.76 KG/M2 | DIASTOLIC BLOOD PRESSURE: 64 MMHG | SYSTOLIC BLOOD PRESSURE: 110 MMHG | OXYGEN SATURATION: 98 % | WEIGHT: 142.6 LBS | HEIGHT: 65 IN | HEART RATE: 88 BPM | TEMPERATURE: 98.7 F

## 2019-04-18 DIAGNOSIS — L20.9 ATOPIC DERMATITIS, UNSPECIFIED TYPE: Primary | ICD-10-CM

## 2019-04-18 PROCEDURE — 99213 OFFICE O/P EST LOW 20 MIN: CPT | Mod: GE | Performed by: STUDENT IN AN ORGANIZED HEALTH CARE EDUCATION/TRAINING PROGRAM

## 2019-04-18 RX ORDER — PIMECROLIMUS 10 MG/G
CREAM TOPICAL
Qty: 30 G | Refills: 1 | Status: SHIPPED | OUTPATIENT
Start: 2019-04-18 | End: 2022-08-11

## 2019-04-18 ASSESSMENT — MIFFLIN-ST. JEOR: SCORE: 1393.74

## 2019-04-18 NOTE — PATIENT INSTRUCTIONS
Thank you for coming to clinic today. It was a pleasure to see you and I would be happy to see you back at any time for follow up or for new health issues.    1. Atopic dermatitis  - Presumed to be ezcema but could certainly be other types of dermatitis so I would like you to follow up with Dermatology. Referral has been made.   - Recommend cetaphil or cerave brand lotions and cleansers. Use the moisturizer in a tub - should use either creams or ointments for better barrier.  Moisturize at least twice a day.  - Avoid water that is too hot in the shower.   - Avoid other soaps that may dry out your skin.   - Can keep using Benadryl at night, during the day can try other antihistamine (Zyrtec, Allegra, Claritin) for itching.   - Start Elidel twice a day. Should have less burning than steroids and tacrolismus.     I would like for you to follow up with dermatology for further care - they have more expertise in this field!    Lilian Oconnor MD    Patient Education     Atopic Dermatitis (Adult)  Atopic dermatitis is a dry, itchy, red rash. It s also called eczema. The rash is chronic, or ongoing. It can come and go over time. The disease is often passed down in families. It causes a problem with the skin barrier that makes the skin more sensitive to the environment and other factors. The increased skin sensitivity causes an itch, which causes scratching. Scratching can worsen the itching or also break the skin. This can put the skin at risk of infection.  The condition is most common in people with asthma, hay fever, hives, or dry or sensitive skin. The rash may be caused by extreme heat or heavy sweating. Skin irritants can cause the rash to flare up. These can include wool or silk clothing, grease, oils, some medicines, and harsh soaps and detergents. Emotional stress can also be a trigger.  Treatment is done to relieve the itching and inflammation of the skin.  Home care  Follow these tips to care for your  condition:    Keep the areas of rash clean by bathing at least every other day. Use lukewarm water to bathe. Don t use hot water, which can dry out the skin.    Don t use soaps with strong detergents. Use mild soaps made for sensitive skin.    Apply a cream or ointment to damp skin right after bathing.    Avoid things that irritate your skin. Wear absorbent, soft fabrics next to the skin rather than rough or scratchy materials.    Use mild laundry soap free of scents and perfumes. Make sure to rinse all the soap out of your clothes.    Treat any skin infection as directed.    Use oral diphenhydramine to help reduce itching. This is an antihistamine you can buy at drug and grocery stores. It can make you sleepy, so use lower doses during the daytime. Or you can use loratadine. This is an antihistamine that will not make you sleepy. Do not use diphenhydramine if you have glaucoma or have trouble urinating due to an enlarged prostate.  Follow-up care  See your healthcare provider, or as advised. If your symptoms don t get better or if they get worse in the next 7 days, make an appointment with your healthcare provider.  When to seek medical advice  Call your healthcare provider right away  if any of these occur:    Increasing area of redness or pain in the skin    Yellow crusts or wet drainage from the rash    Fever of 100.4 F (38 C) or higher, or as directed by your healthcare provider  Date Last Reviewed: 9/1/2016 2000-2018 [employer]. [address] [place] [address], [place] [address]. All rights reserved. This information is not intended as a substitute for professional medical care. Always follow your healthcare professional's instructions.

## 2019-04-18 NOTE — PROGRESS NOTES
SUBJECTIVE:   Lynne Cortez is a 23 year old female who presents to clinic today for the following health issues:      Rash      Duration: 5 years but worse over last two weeks.    Description  Location: arms, hands, back of neck, small spot on tip of nose  Itching: severe    Intensity:  moderate    Accompanying signs and symptoms: Pain    History (similar episodes/previous evaluation): Yes    Precipitating or alleviating factors:  New exposures:  None  Recent travel: no      Therapies tried and outcome: Kenalog, tacrolimus, nystatin, cortisone, Aquaphor     History of eczema for the last 5 years.  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  States that she has had allergy testing showing that she is allergic to hairstyling products including color and other chemicals.  She has been avoiding these allergens.  She only cuts men's hair now and does not do any color.  She wears gloves when she works.  Despite this she continues to have flareup of her eczema.  Eczema is over both forearms, hands, small spots on nape of neck, and possible small new spot on tip of nose and corner of mouth. It is very pruritic.  Shower and steroids make her skin burn and sting.  She has been prescribed multiple steroids including betamethasone, triamcinolone, hydrocortisone over-the-counter, and tacrolimus 0.1%.  She is currently using triamcinolone as needed every several days, but does not like to use it because she is afraid of her skin thinning and also it is very painful on her skin.  She moisturizes with Aquaphor once a day at night.  She uses Benadryl at night as needed for itching.  She has seen multiple dermatologists in the past, but not for the last year.  She has no history of atopy.  There is no family history of atopy.  She did not have eczema in childhood.      Additional history: as documented    Reviewed  and updated as needed this visit by clinical staff  Tobacco  Allergies  Meds  Med Hx  Surg Hx   Fam Hx  Soc Hx        Reviewed and updated as needed this visit by Provider         Patient Active Problem List   Diagnosis     ADD (attention deficit disorder) without hyperactivity     IUD (intrauterine device) in place: Saida     Past Surgical History:   Procedure Laterality Date     C LEVONORGESTREL-RELEASE INTRAUTERINE CONTRACEPTIVE (SAIDA), 13.5 MG  07/19/2017    remove by 7/19/20     NO HISTORY OF SURGERY         Social History     Tobacco Use     Smoking status: Never Smoker     Smokeless tobacco: Never Used   Substance Use Topics     Alcohol use: Yes     Alcohol/week: 0.0 oz     Comment: 2 times per month, 5-6 per time     Family History   Problem Relation Age of Onset     Cerebrovascular Disease Mother         2 strokes - at age 48     Lipids Mother      Hypertension Father      Lipids Maternal Grandmother      Hypertension Maternal Grandmother      Lipids Maternal Grandfather      Other Cancer Maternal Grandfather         lymphoma     Breast Cancer No family hx of          Current Outpatient Medications   Medication Sig Dispense Refill     Levonorgestrel (SAIDA IU)        pimecrolimus (ELIDEL) 1 % external cream Apply thin layer to affected areas twice a day. 30 g 1     UNABLE TO FIND Take 1 tablet by mouth daily MEDICATION NAME: Up and Up Allergy       Allergies   Allergen Reactions     Gold      Confirmed with patch test.      No Clinical Screening - See Comments      Hair dye and the ingredients.        BP Readings from Last 3 Encounters:   04/18/19 110/64   07/11/18 110/66   07/19/17 114/50    Wt Readings from Last 3 Encounters:   04/18/19 64.7 kg (142 lb 9.6 oz)   07/11/18 65.4 kg (144 lb 3.2 oz)   07/19/17 58.5 kg (129 lb)           ROS:  Constitutional, HEENT, cardiovascular, pulmonary, GI, , musculoskeletal, neuro, skin, endocrine and psych systems are negative, except as otherwise noted.    OBJECTIVE:     /64 (BP Location: Right arm, Patient Position: Sitting, Cuff Size: Adult Regular)  "  Pulse 88   Temp 98.7  F (37.1  C) (Tympanic)   Ht 1.645 m (5' 4.75\")   Wt 64.7 kg (142 lb 9.6 oz)   SpO2 98%   BMI 23.91 kg/m    Body mass index is 23.91 kg/m .    Physical Exam  General: awake, alert, in no acute distress  HEENT: NCAT, PERRL, EOMI, sclera anicteric, no nasal discharge, MMM, posterior pharynx without erythema or exudates, no cervical lymphadenopathy  CV: RRR, no murmurs noted, peripheral pulses strong  Resp: CTAB, no increased WOB  Abd: Soft, nontender  MSK: No peripheral edema, extremities warm and well perfused, normal pulses  Skin: Erythematous rash with scaling and excoriations over forearms, fingers, knuckles. Small spot on back of neck. No signs of superinfection.  Neuro: CN II-XII grossly intact. No focal deficits. Alert and oriented x4.    Diagnostic Test Results:  none     ASSESSMENT/PLAN:   1. Atopic dermatitis, unspecified type  Significant burden of disease of her forearms, hands.  Most likely consistent with eczema although other differentials include contact dermatitis or psoriasis.  Will refer to dermatology.  She has been on multiple steroid and tacrolimus.  We will try Elidel until she can see be seen by dermatology, with the hope that it will cause less burning and stinging on her skin.  - DERMATOLOGY REFERRAL  - pimecrolimus (ELIDEL) 1 % external cream; Apply thin layer to affected areas twice a day.  Dispense: 30 g; Refill: 1  - Recommend cetaphil or cerave brand lotions and cleansers, moisturize at least twice a day. Use tub creams or ointments.  - Avoid hot water  - Can keep using Benadryl at night, during the day can try other antihistamine (Zyrtec, Allegra, Claritin) for itching    FUTURE APPOINTMENTS:       - Make appointment with dermatology specialist    Patient was staffed with attending, Dr. Lennie Lyon, who agrees with the above assessment and plan.      Lilian Oconnor MD  PGY - 3  Internal Medicine/Pediatrics  Pager [phone]  [facility] " TRAY    Attestation:    I have reviewed the documentation from Dr. Oconnor and discussed the findings with Dr. Oconnor. I agree with the documentation of Dr. Oconnor.    Lennie Lyon MD  Internal Medicine/Pediatrics  [facility]

## 2019-08-01 ENCOUNTER — NURSE TRIAGE (OUTPATIENT)
Dept: NURSING | Facility: CLINIC | Age: 24
End: 2019-08-01

## 2019-08-01 ENCOUNTER — TRANSFERRED RECORDS (OUTPATIENT)
Dept: HEALTH INFORMATION MANAGEMENT | Facility: CLINIC | Age: 24
End: 2019-08-01

## 2019-08-01 ENCOUNTER — TELEPHONE (OUTPATIENT)
Dept: PEDIATRICS | Facility: CLINIC | Age: 24
End: 2019-08-01

## 2019-08-01 DIAGNOSIS — L20.9 ATOPIC DERMATITIS, UNSPECIFIED TYPE: Primary | ICD-10-CM

## 2019-08-01 NOTE — TELEPHONE ENCOUNTER
Referral signed.    Please advise mom this then goes to our referrals who will gather any records to be sent to [place].  [place] then reviews records to see if appropriate.    Patient needs to make sure [place] is covered by insurance.    Lennie Lyon MD  Internal Medicine/Pediatrics  [facility]

## 2019-08-01 NOTE — TELEPHONE ENCOUNTER
LOV was on 4/18/19 for atopic dermatitis. Sending to  & (preceptor). Also routing to referral.    Notes from 4/18:  Atopic dermatitis, unspecified type  Significant burden of disease of her forearms, hands.  Most likely consistent with eczema although other differentials include contact dermatitis or psoriasis.  Will refer to dermatology.  She has been on multiple steroid and tacrolimus.  We will try Elidel until she can see be seen by dermatology, with the hope that it will cause less burning and stinging on her skin.  - DERMATOLOGY REFERRAL  - pimecrolimus (ELIDEL) 1 % external cream; Apply thin layer to affected areas twice a day.  Dispense: 30 g; Refill: 1  - Recommend cetaphil or cerave brand lotions and cleansers, moisturize at least twice a day. Use tub creams or ointments.  - Avoid hot water  - Can keep using Benadryl at night, during the day can try other antihistamine (Zyrtec, Allegra, Claritin) for itching    Aretha, RN  Triage Nurse

## 2019-08-01 NOTE — TELEPHONE ENCOUNTER
Michael Padilla is calling regarding Lynne and skin allergies that Lynne is having.  Mom is requesting a referral for daughter Lynne to go [facility].  [facility] in [place], [place] Attention:  Allergy Department.  MD Quinonez is the MD at [place] location.  Also more MD's are at [place] Location also.  Mom is requesting referral be sent today.  Please phone michael Padilla back once this has been completed at [phone].

## 2019-08-01 NOTE — TELEPHONE ENCOUNTER
Clinic Action Needed:  Yes, referral  [facility] Triage Call  Presenting Problem:    Michael Padilla is calling regarding Lynne and skin allergies that Lynne is having.  Mom is requesting a referral for daughter Lynne to go [facility].  [facility] in [place], [place] Attention:  Allergy Department.  MD Quinonez is the MD at [place] location.  Also more MD's are at [facility] also.  Mom is requesting referral be sent today.  Please phone michael Padilla back once this has been completed at [phone].       Routed to:  RN Pool    Please be sure to close this encounter once this patient's issue/question has been addressed.    Kenna Padilla RN/[facility]

## 2019-08-01 NOTE — TELEPHONE ENCOUNTER
[place] is out of [facility] and pt's insurance requires pt to stay within the [facility].    [place]-Referrals

## 2019-08-02 ENCOUNTER — TRANSFERRED RECORDS (OUTPATIENT)
Dept: HEALTH INFORMATION MANAGEMENT | Facility: CLINIC | Age: 24
End: 2019-08-02

## 2019-08-02 NOTE — TELEPHONE ENCOUNTER
Telephone call placed to patient's mother, left a voice message with request for return call.     Sara Burdick RN BSN   [facility]

## 2019-08-02 NOTE — TELEPHONE ENCOUNTER
Thanks [place] - please call the mom and let her know this.    Thanks,    Lennie Lyon MD  Internal Medicine/Pediatrics  [facility]

## 2019-08-05 NOTE — TELEPHONE ENCOUNTER
Spoke to pt mom, she states that the patient went to the [facility]. I explained that pt ins. Requires her to be seen in [place] . [place] is not [facility]. Mom states that pt is on [employer]. She will have her call in with the information. Christina Melissa LPN

## 2019-08-08 NOTE — TELEPHONE ENCOUNTER
Action 8/8/2019  3:38pm  PP   Action Taken Called pt, seen at [facility]. Call her mom with further questions: Valerie - [phone]     Action 8/9/2019 10:44am PP   Action Taken Faxed requests to [facility].     Action 8/14/2019 10:29am  PP   Action Taken Called [facility] and they already faxed recs. Called Ivonne TAVERAS and confirmed that recs were received and sent to [facility]. [facility] still needs to put recs in chart.        FUTURE VISIT INFORMATION      FUTURE VISIT INFORMATION:    Date: 9/23/2019    Time: 7:20am    Location:  Allergy  REFERRAL INFORMATION:    Referring provider:  Self    Referring providers clinic:  N/A    Reason for visit/diagnosis:  Allergies    RECORDS REQUESTED FROM:       Clinic name Comments Records Status Imaging Status   [facility] [place] 4/18/2019 - Office Visit - Dr. Orin Oconnor [facility] 8/2/2019, 8/1/2019 - Office Visit - Dayday RODRIGUEZ Received (Scanned)                                Action Ivonne Miles 8.13.19 3:05 pm   Action Taken Received fax from [facility] clinic stating that they do not have any records on this patient.

## 2019-08-14 ENCOUNTER — OFFICE VISIT (OUTPATIENT)
Dept: ALLERGY | Facility: CLINIC | Age: 24
End: 2019-08-14
Payer: COMMERCIAL

## 2019-08-14 DIAGNOSIS — L23.9 ALLERGIC CONTACT DERMATITIS, UNSPECIFIED TRIGGER: Primary | ICD-10-CM

## 2019-08-14 RX ORDER — EMOLLIENT BASE
CREAM (GRAM) TOPICAL DAILY
Qty: 454 G | Refills: 11 | Status: SHIPPED | OUTPATIENT
Start: 2019-08-14 | End: 2022-08-11

## 2019-08-14 RX ORDER — MOMETASONE FUROATE 1 MG/G
CREAM TOPICAL DAILY
Qty: 50 G | Refills: 11 | Status: SHIPPED | OUTPATIENT
Start: 2019-08-14 | End: 2022-08-11

## 2019-08-14 RX ORDER — MONTELUKAST SODIUM 10 MG/1
1 TABLET ORAL DAILY
Refills: 5 | COMMUNITY
Start: 2019-08-02 | End: 2022-08-11

## 2019-08-14 RX ORDER — DEXTROAMPHETAMINE SACCHARATE, AMPHETAMINE ASPARTATE, DEXTROAMPHETAMINE SULFATE AND AMPHETAMINE SULFATE 2.5; 2.5; 2.5; 2.5 MG/1; MG/1; MG/1; MG/1
TABLET ORAL
Refills: 0 | COMMUNITY
Start: 2019-08-02 | End: 2022-08-11

## 2019-08-14 ASSESSMENT — PAIN SCALES - GENERAL: PAINLEVEL: NO PAIN (0)

## 2019-08-14 NOTE — LETTER
[facility]  [address] [address]  [phone]    2019    Re: Lynne Cortez    : 1995      To Whom It May Concern:      Lynne Cortez was seen and evaluated in clinic today (2019) due to medical illness.  In order to facilitate diagnosis of her medical illness, please allow her to work in the  or some other appropriate job in which she avoids suspected aerosolized triggers (such as hair spray and fragrances). In addition, please excuse her from work from 19 to 19 for medical testing. These dates may be extended pending clinical course    If you have any questions, please do not hesitate to call at the number listed above.       Sincerely,      Blanco Wellington MD  Medicine/Dermatology PGY-2

## 2019-08-14 NOTE — LETTER
8/14/2019         RE: Lynne Cortez  [address]  [place] [address]        Dear Colleague,    Thank you for referring your patient, Lynne Cortez, to the [facility]. Please see a copy of my visit note below.    [facility] Dermatology Note    Dermatology Problem List:  1. Contact dermatitis  - Unclear triggers but suspected aerosolized given distribution. 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 Has been previously tested by outsider Allergy group. Told that she has a contact dermatitis to Gold and PPD (possibly MCI).   - Current Tx: mometasone cream and tacrolimus    Encounter Date: Aug 14, 2019    CC:   Consult for allergic contact dermatitis    History of Present Illness:  Ms. Lynne Cortez is a 24 year old female with past dermatologic history listed above who presents in self referral for further evaluation of suspected contact dermatitis. Presents with her mom to clinic. Endorses that 6 years ago, she began a new job as hairdresser. About a year afterwards, began noting a hand rash. Described as red, intensely itchy, and scaly. Continued work but had to change her job to avoid suspected triggers (hair coloring products). Over the last 6 months, rash has progressed to involve arms, chest, face, neck, upper back, axillae, and medial thighs. Has been using triamcinolone, elidel, and tacrolimus intermittently.     Of note, went to outside patch testing and was told she has a contact dermatitis to gold and PPD (possibly also MCI)    Past Medical History:   Patient Active Problem List   Diagnosis     ADD (attention deficit disorder) without hyperactivity     IUD (intrauterine device) in place: Saida     Past Medical History:   Diagnosis Date     NO ACTIVE PROBLEMS      Past Surgical History:   Procedure Laterality Date     C LEVONORGESTREL-RELEASE INTRAUTERINE CONTRACEPTIVE (SAIDA), 13.5 MG   07/19/2017    remove by 7/19/20     NO HISTORY OF SURGERY         Social History:  Patient  reports that she has never smoked. She has never used smokeless tobacco. She reports that she drinks alcohol. She reports that she does not use drugs.    Family History:  Family History   Problem Relation Age of Onset     Cerebrovascular Disease Mother         2 strokes - at age 48     Lipids Mother      Hypertension Father      Lipids Maternal Grandmother      Hypertension Maternal Grandmother      Lipids Maternal Grandfather      Other Cancer Maternal Grandfather         lymphoma     Breast Cancer No family hx of        Medications:  Current Outpatient Medications   Medication Sig Dispense Refill     amphetamine-dextroamphetamine (ADDERALL) 10 MG tablet TAKE 1 TABLET BY MOUTH EVERY MORNING AND 1 TABLET IN THE AFTERNOON IF NEEDED  0     Levonorgestrel (SAIDA IU)        montelukast (SINGULAIR) 10 MG tablet Take 1 tablet by mouth daily  5     pimecrolimus (ELIDEL) 1 % external cream Apply thin layer to affected areas twice a day. 30 g 1     UNABLE TO FIND Take 1 tablet by mouth daily MEDICATION NAME: Up and Up Allergy          Allergies   Allergen Reactions     Gold      Confirmed with patch test.      No Clinical Screening - See Comments      Hair dye and the ingredients.          Review of Systems:  - As per HPI  - Constitutional: Otherwise feeling well today, in usual state of health.  - Skin: As above in HPI. No additional skin concerns.    Physical exam:  Vitals: There were no vitals taken for this visit.  GEN: This is a well developed, well-nourished female in no acute distress, in a pleasant mood.    SKIN: Waist-up skin, which includes the head/face, neck, both arms, chest, back, abdomen, digits and/or nails was examined.  - Lorenzo Skin Type 1  - There are numerous pink scaly papules coalescing into plaques on the face, chest, neck, arms, axillae, and hands with some lichenification and evidence of excoriations.  -  No other lesions of concern on areas examined.     Order for PATCH TESTS     [x] Outpatient  [] Inpatient: [facility]..../ Bed ....      Skin Atopy (atopic dermatitis) [] Yes   [x] No  Rhinitis/Sinusitis:   [] Yes   [x] No  Allergic Asthma:   [] Yes   [x] No  Food Allergy:   [] Yes   [x] No  Leg ulcers:   [] Yes   [x] No  Hand eczema:   [x] Yes   [] No   Leading hand:   [x] R   [] L       [] Ambidextrous                      Reason for tests (suspected allergy): persistent contact dermatitis  Known previous allergies: gold and PPD (by outside allergist); possibly MCI    Standardized panels  [x] Standard panel (40 tests)  [x] Preservatives & Antimicrobials (31 tests)  [x] Emulsifiers & Additives (25 tests)   [x] Perfumes/Flavours & Plants (25 tests)  [x] Hairdresser panel (12 tests)  [x] Rubber Chemicals (22 tests)  [x] Plastics (26 tests)  [] Colorants/Dyes/Food additives (20 tests)  [] Metals (implants/dental) (24 tests)  [] Local anaesthetics/NSAIDs (13 tests)  [] Antibiotics & Antimycotics (14 tests)   [] Corticosteroids (15 tests)   [] Photopatch test (62 tests)   [x] others: gold      [] Patient's own products: ...    DO NOT test if chemical or biological identity is unknown!     always ask from patient the product information and safety sheets (MSDS)     [] Patient needs consultation with Allergy team (changes of tests may apply)  [] Tests discussed with Allergy team (can have direct appointment for patch tests)    Impression/Plan:  1. Allergic contact dermatitis    Based off H&P, presenting condition is most likely allergic contact dermatitis. Trigger is currently unknown but suspected to be in her work environment (aerosolized). Will plan for work avoidance and patch testing within 2-3 weeks    Prescribed mometasone cream    Recommended vanicream and Free&Clear shampoo, conditioner, soaps, and detergents    In addition, recommended cessation of anti-histamines for 5 days prior to patch testing    Follow-up in 2-3  weeks, earlier for new or changing lesions.     Staff Involved:  Patient was seen and staffed with attending physician Dr. Augustin Wellington MD  Med/Derm Resident PGY-2  P:[phone]    Staff Addendum:  Staff Physician Comments:  I saw and evaluated the patient with the resident and I agree with the assessment and plan as documented in the resident's note.    Benigno Ruffin MD  Professor  Head of Dermato-Allergy Division  Department of Dermatology  [facility]  I spent a total of 40 minutes face to face with Lynne Cortez during today s office visit. Over 50% of this time was spent counseling the patient and/or coordinating care. Please see Assessment and Plan for details.        Again, thank you for allowing me to participate in the care of your patient.        Sincerely,        Benigno Ruffin MD

## 2019-08-14 NOTE — PROGRESS NOTES
[facility] Dermatology Note    Dermatology Problem List:  1. Contact dermatitis  - Unclear triggers but suspected aerosolized given distribution. 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 Has been previously tested by outsider Allergy group. Told that she has a contact dermatitis to Gold and PPD (possibly MCI).   - Current Tx: mometasone cream and tacrolimus    Encounter Date: Aug 14, 2019    CC:   Consult for allergic contact dermatitis    History of Present Illness:  Ms. Lynne Cortez is a 24 year old female with past dermatologic history listed above who presents in self referral for further evaluation of suspected contact dermatitis. Presents with her mom to clinic. Endorses that 6 years ago, she began a new job as hairdresser. About a year afterwards, began noting a hand rash. Described as red, intensely itchy, and scaly. Continued work but had to change her job to avoid suspected triggers (hair coloring products). Over the last 6 months, rash has progressed to involve arms, chest, face, neck, upper back, axillae, and medial thighs. Has been using triamcinolone, elidel, and tacrolimus intermittently.     Of note, went to outside patch testing and was told she has a contact dermatitis to gold and PPD (possibly also MCI)    Past Medical History:   Patient Active Problem List   Diagnosis     ADD (attention deficit disorder) without hyperactivity     IUD (intrauterine device) in place: Saida     Past Medical History:   Diagnosis Date     NO ACTIVE PROBLEMS      Past Surgical History:   Procedure Laterality Date     C LEVONORGESTREL-RELEASE INTRAUTERINE CONTRACEPTIVE (SAIDA), 13.5 MG  07/19/2017    remove by 7/19/20     NO HISTORY OF SURGERY         Social History:  Patient  reports that she has never smoked. She has never used smokeless tobacco. She reports that she drinks alcohol. She reports that she does not use  drugs.    Family History:  Family History   Problem Relation Age of Onset     Cerebrovascular Disease Mother         2 strokes - at age 48     Lipids Mother      Hypertension Father      Lipids Maternal Grandmother      Hypertension Maternal Grandmother      Lipids Maternal Grandfather      Other Cancer Maternal Grandfather         lymphoma     Breast Cancer No family hx of        Medications:  Current Outpatient Medications   Medication Sig Dispense Refill     amphetamine-dextroamphetamine (ADDERALL) 10 MG tablet TAKE 1 TABLET BY MOUTH EVERY MORNING AND 1 TABLET IN THE AFTERNOON IF NEEDED  0     Levonorgestrel (SAIDA IU)        montelukast (SINGULAIR) 10 MG tablet Take 1 tablet by mouth daily  5     pimecrolimus (ELIDEL) 1 % external cream Apply thin layer to affected areas twice a day. 30 g 1     UNABLE TO FIND Take 1 tablet by mouth daily MEDICATION NAME: [facility]          Allergies   Allergen Reactions     Gold      Confirmed with patch test.      No Clinical Screening - See Comments      Hair dye and the ingredients.          Review of Systems:  - As per HPI  - Constitutional: Otherwise feeling well today, in usual state of health.  - Skin: As above in HPI. No additional skin concerns.    Physical exam:  Vitals: There were no vitals taken for this visit.  GEN: This is a well developed, well-nourished female in no acute distress, in a pleasant mood.    SKIN: Waist-up skin, which includes the head/face, neck, both arms, chest, back, abdomen, digits and/or nails was examined.  - Lorenzo Skin Type 1  - There are numerous pink scaly papules coalescing into plaques on the face, chest, neck, arms, axillae, and hands with some lichenification and evidence of excoriations.  - No other lesions of concern on areas examined.     Order for PATCH TESTS     [x] Outpatient  [] Inpatient: [facility]..../ Bed ....      Skin Atopy (atopic dermatitis) [] Yes   [x] No  Rhinitis/Sinusitis:   [] Yes   [x] No  Allergic  Asthma:   [] Yes   [x] No  Food Allergy:   [] Yes   [x] No  Leg ulcers:   [] Yes   [x] No  Hand eczema:   [x] Yes   [] No   Leading hand:   [x] R   [] L       [] Ambidextrous                      Reason for tests (suspected allergy): persistent contact dermatitis  Known previous allergies: gold and PPD (by outside allergist); possibly MCI    Standardized panels  [x] Standard panel (40 tests)  [x] Preservatives & Antimicrobials (31 tests)  [x] Emulsifiers & Additives (25 tests)   [x] Perfumes/Flavours & Plants (25 tests)  [x] Hairdresser panel (12 tests)  [x] Rubber Chemicals (22 tests)  [x] Plastics (26 tests)  [] Colorants/Dyes/Food additives (20 tests)  [] Metals (implants/dental) (24 tests)  [] Local anaesthetics/NSAIDs (13 tests)  [] Antibiotics & Antimycotics (14 tests)   [] Corticosteroids (15 tests)   [] Photopatch test (62 tests)   [x] others: gold      [] Patient's own products: ...    DO NOT test if chemical or biological identity is unknown!     always ask from patient the product information and safety sheets (MSDS)     [] Patient needs consultation with Allergy team (changes of tests may apply)  [] Tests discussed with Allergy team (can have direct appointment for patch tests)    Impression/Plan:  1. Allergic contact dermatitis    Based off H&P, presenting condition is most likely allergic contact dermatitis. Trigger is currently unknown but suspected to be in her work environment (aerosolized). Will plan for work avoidance and patch testing within 2-3 weeks    Prescribed mometasone cream    Recommended vanicream and Free&Clear shampoo, conditioner, soaps, and detergents    In addition, recommended cessation of anti-histamines for 5 days prior to patch testing    Follow-up in 2-3 weeks, earlier for new or changing lesions.     Staff Involved:  Patient was seen and staffed with attending physician Dr. Augustin Wellingtno MD  Med/Derm Resident PGY-2  P:[phone]    Staff Addendum:  Staff  Physician Comments:  I saw and evaluated the patient with the resident and I agree with the assessment and plan as documented in the resident's note.    Benigno Ruffin MD  Professor  Head of Dermato-Allergy Division  Department of Dermatology  [facility]  I spent a total of 40 minutes face to face with Lynne Cortez during today s office visit. Over 50% of this time was spent counseling the patient and/or coordinating care. Please see Assessment and Plan for details.

## 2019-08-14 NOTE — PATIENT INSTRUCTIONS
1. Allergic contact dermatitis  - For new flares, let us know what you've been doing for the past 3 days  - We will have to wait until your systemic steroid shot has decreased before doing patch testing  - If possible, please avoid suspected triggers for 2 weeks prior to patch testing. If you're still having a flare while working at the , we should avoid the trigger completely  - Can use new prescription (mometasone) daily in combination with tacrolimus to help relieve current flare. Don't use on the back for 5 days prior to test  - Can use Vanicream for moisturizer and free-and-clear shampoo/conditioner/soap/detergents  - Stop the Zyrtec and zantac (all anti-histamine medications) 5 days prior        Pediatric Dermatology Bleach Bath instructions    Type: Regular bleach used for clothing    For children:    1/4 cup concentrated bleach  or 1/2 cup plain bleach for a full tub 2- 3 times weekly      If child is swimming at least 2 - 3 times weekly bleach baths are not needed.           Patch Testing Information  What are allergen patch tests?    The test is done to look for skin allergies that may be causing rashes and irritation.    A patch test is a way of identifying whether a substance has caused a delayed reaction with skin inflammation, such as contact eczema or delayed (after days) reactions to drugs.     We will use various types of test compounds, which may include common allergens you may come in contact with in daily life such as preservatives, fragrances or even drugs.     Most of the time we will use standardized, prefabricated test solutions. The choice of the substances and series tested will depend on your history of reactions. Sometimes we will test your own products as well.     In order to avoid severe toxic reactions we need detailed information or safety sheets for each of the test compounds.    The test panels are set up with a small amount of common substances that cause skin  allergies. They are taped to your skin with a clear hypoallergenic bandage and reinforced hypoallergenic tape.    The substances are numbered, so it is easy to tell what is causing a skin reaction.  What do I need to do in preparation for the test?    Stop all systemic steroids 1 month prior to the testing.     Stop applying topical steroids to the test area one week prior to the test. It is to use them elsewhere throughout the testing process. If this is not possible then discuss options with the Allergy specialist.    Do not go sunbathing or tanning for one week prior to testing    It is okay to take antihistamines as normal.     Please wear dark colors the week of the test since we will write on you with a dark marker that may transfer and stain clothing or bedding.     Some medications can affect the reactions to allergens during the tests. Therefore reveal all the medications you take to the allergy team. The doctor will discuss the medications with you before the tests.     Eat how you normally would.    Avoid the following:    You cannot get the test area wet during the entire test period. This means no bathing or swimming the entire test period.    No strenuous exercise that may cause sweating.    Do not scratch the test area, this can cause the allergen to spread and give us false positives.     Avoid exposure to UV irradiation. This means no tanning or UV treatment during the testing period.   What can I expect?    Patch testing is done over three appointments.   o The usual schedule is: Monday (Allergen patches are placed), Wednesday (Patches are removed and skin is examined by the MD), and Friday (Skin is examined by the MD)      If you are allergic, there will be an area of irritation where the test was placed.    Itching or burning at the test site might happen if you are allergic to the allergen.  Do not rub or scratch the test site since this may spread the allergen and possibly cause false  positives. If itching or burning is not tolerable please contact the clinic.    The marker we draw on your back with ma take up to 5 weeks to wash off completely. Rubbing alcohol can help speed up this process.     Reactions can occur 1 to 2 weeks after the tests are applied. If this happens please take photos of the area and contact the clinic.  What should I do after the tests are placed?    Keep the area dry. No showering or getting the test area wet from the time we see you at your first visit until after your third appointment. If you get the test area wet you are washing off the test and we could get false negative reactions.    If you notice any of the test patches coming loose put on more tape to re-secure the test.    If the marker that is applied fades you can use a dark pen to miguel around the panel sites.    Cover the test area when you are outside to avoid any sun exposure while the test is in place.     You can remove the tests only if there is a severe reaction (itch, pain, blistering). Please report this to your doctor immediately. If you have to remove the tests please miguel the locations of each test field with a grid so we can identify the allergen.  WHAT ARE THE POSSIBLE RISKS OF PATCH TESTS     If you are allergic to a compound tested you will develop a localized skin reaction similar to your previous reaction, this may take days to develop. These reactions include a formation of red, itchy skin lesions that could be about a centimeter with small vesicles or possibly even blisters. The lesions will fade over time, this may take weeks. The test might leave some skin pigmentation for a few months.     In rare cases a localized reaction to patch testing can become generalized.     The tests with your own products might have some risks because they are not standardized and unanticipated reactions could occur. We need as much information as possible to evaluate if your own products are safe to test and  under what conditions. This has to be evaluated for each individual product.   Useful Contact Information    To contact your doctor you can either send a [employer] message or call [phone]     Address  o [address] [place] [address]    If you develop any serious or adverse allergic reaction after office hours please seek immediate medical assistance at the nearest clinic, urgent care, or emergency room.

## 2019-08-14 NOTE — NURSING NOTE
Allergy Rooming Note    Lynne Cortez's goals for this visit include:   Chief Complaint   Patient presents with     Allergy Consult     Lynne is here regarding an allergy consult. She states that she has allergies to products at work.      Jo Allen LPN

## 2019-08-28 ENCOUNTER — DOCUMENTATION ONLY (OUTPATIENT)
Dept: ALLERGY | Facility: CLINIC | Age: 24
End: 2019-08-28

## 2019-08-28 NOTE — LETTER
Dear Insurance Carrier,      One of your enrolled members, Lynne Cortez , has been referred by Dr.Paul Ruffin, who is a dermatoallergist, for Comprehensive Patch Testing. Comprehensive patch testing is medically   necessary for this patient.    COMPREHENSIVE PATCH TESTING IS INDICATED FOR PATIENTS WHO HAVE  CHRONIC DERMATITIS THAT HAS NOT IMPROVED AFTER RECEIVING  AGGRESSIVE TREATMENT BY DERMATOLOGISTS AND/OR ALLERGISTS  AND WHICH SEVERELY IMPACTS THE INDIVIDUAL S HEALTH AND  QUALITY OF LIFE.    This patient meets the above criteria and thus requires comprehensive patch testing. This  is likely to entail more than 80 units of CPT code 38887. In addition, Dr. Ruffin will spend  a substantial amount of time working to determine the cause of the dermatitis and then is  able to more precisely develop a personalized treatment plan.    This Comprehensive Patch Testing cannot be performed by most allergists or  dermatologists, who are only able to perform limited patch testing, which is inadequate or  inappropriate for the diagnosis of possible contact allergy.    Please see the following document for additional information and many peer reviewed  references on the medical necessity of Comprehensive Patch Testing.

## 2019-08-28 NOTE — PROGRESS NOTES
Order documented by: Andreea Guerrier CMA  Order reviewed by: Jo Allen LPN   Physician:    Date/time of application:09/16/2019  Localization of application: Back    STANDARD Series         No Substance 2 days 4 days remarks   1 Isidro Mix [C] - -    2 Colophony - -    3  2-Mercaptobenzothiazole  - -     4 Methylisothiazolinone - -    5 Carba Mix - -    6 Thiuram Mix [A] - -    7 Bisphenol A Epoxy Resin - -    8 D-Ggow-Rbuhnodjnuq-Formaldehyde Resin - -    9 Mercapto Mix [A] - -    10 Black Rubber Mix- PPD [B] - -    11 Potassium Dichromate  -  -    12 Balsam of [place] (Myroxylon Pereirae Resin) - -    13 Nickel Sulphate Hexahydrate - -    14 Mixed Dialkyl Thiourea - -    15 Paraben Mix [B] - -    16 Methyldibromo Glutaronitrile - -    17 Fragrance Mix - -    18 2-Bromo-2-Nitropropane-1,3-Diol (Bronopol) - -    19 Lyral - -    20 Tixocortol-21- Pivalate - -    21 Diazolidiyl Urea (Germall II) - -    22 Methyl Methacrylate - -    23 Cobalt (II) Chloride Hexahydrate - -    24 Fragrance Mix II  - -    25 Compositae Mix - -    26 Benzoyl Peroxide - -    27 Bacitracin - -    28 Formaldehyde - -    29 Methylchloroisothiazolinone / Methylisothiazolinone - -    30 Corticosteroid Mix - -    31 Sodium Lauryl Sulfate - -    32 Lanolin Alcohol - -    33 Turpentine - -    34 Cetylstearylalcohol - -    35 Chlorhexidine Dicluconate - -    36 Budenoside - -    37 Imidazolidinyl Urea  - -    38 Ethyl-2 Cyanoacrylate - -    39 Quaternium 15 (Dowicil 200) - -    40 Decyl Glucoside - -      EMULSIFIERS & ADDITIVES        No Substance 2 days 4 days remarks   41 Polyethylene Glycol-400 - -    42 Cocamidopropyl Betaine - -    43 Amerchol L101 - -    44 Propylene Glycol - -    45 Triethanolamine - -    46 Sorbitane Sesquiolate - -    47 Isopropylmyristate - -    48 Polysorbate 80  - -    49 Amidoamine   (Stearamidopropyl Dimethylamine) - -    50 Oleamidopropyl Dimethylamine - -    51 Lauryl Glucoside - -    52 Coconut  Diethanolamide  - -    53 2-Hydroxy-4-Methoxy Benzophenone (Oxybenzone) - -    54 Benzophenone-4 (Sulisobenzon) - -    55 Propolis - -    56 Dexpanthenol - -    57 Carboxymethyl Cellulose Sodium - -    58 Abitol - -    59 Tert-Butylhydroquinone - -    60 Benzyl Salicylate - -     Antioxidant      61 Dodecyl Gallate - -    62 Butylhydroxyanisole (BHA) - -    63 Butylhydroxytoluene (BHT) - -    64 Di-Alpha-Tocopherol (Vit E) - -    65 Propyl Gallate - -      PERFUMES, FLAVORS & PLANTS         No Substance 2 days 4 days remarks   66 Benzyl Salicylate - -    67 Benzyl Cinnamate - -    68 Di-Limonene (Dipentene) - -    69 Cananga Odorata (Tennille Null) (I) - -    70 Lichen Acid Mix - -    71 Mentha Piperita Oil (Peppermint Oil) - -    72 Sesquiterpenelactone mix - -    73 Tea Tree Oil, Oxidized - -    74 Wood Tar Mix - -    75 Abietic Acid - -    76 Lavendula Angustifolia Oil (Lavender Oil) - -    77 Camphor  - -     Fragrance Mix I      78 Oakmoss Absolute - -    79 Eugenol - -    80 Geraniol - -    81 Hydroxycitronellal - -    82 Isoeugenol - -    83 Cinnamic Aldehyde - -    84 Cinnamic Alcohol  - -    85 Sorbitane Sesquioleate - -     Fragrance mix II      86 Citronellol - -    87 Alpha-Hexylcinnamic Aldehyde    - -    88 Citral - -    89 Farnesol - -    90 Coumarin - -      PRESERVATIVES & ANTIMICROBIALS         No Substance 2 days 4 days remarks   91  1,2-Benzisothiazoline-3-One, Sodium Salt - -    92  1,3,5-Patrick (2-Hydroxyethyl) - Hexahydrotriazine (Grotan BK) - -    93 4-Eemdpybtmopmc-5-Nitro-1, 3-Propanediol - -    94  3, 4, 4' - Triclocarban - -    95 4 - Chloro - 3 - Cresol - -    96 4 - Chloro - 4 - Xylenol (PCMX) - -    97 7-Ethylbicyclooxazolidine (Bioban NL8159) - -    98 Benzalkonium Chloride - -    99 Benzyl Alcohol - -    100 Cetalkonium Chloride - -    101 Cetylpyrimidine Chloride  - -    102 Chloroacetamide - -    103 DMDM Hydantoin - -    104 Glutaraldehyde - -    105 Triclosan - -    106 Glyoxal  Trimeric Dihydrate - -    107 Iodopropynyl Butylcarbamate - -    108 Octylisothiazoline - -    109 Iodoform - -    110 (Nitrobutyl) Morpholine/(Ethylnitro-Trimethylene) Dimorpholine (Bioban P 1487) - -    111 Phenoxyethanol - -    112 Phenyl Salicylate - -    113 Povidone Iodine - -    114 Sodium Benzoate - -    115 Sodium Disulfite - -    116 Sorbic Acid - -    117 Thimerosal - -     Parabens      118 Butyl-P-Hydroxybenzoate - -    119 Ethyl-P-Hydroxybenzoate - -    120 Methyl-P-Hydroxybenzoate - -    121 Propyl-P-Hydroxybenzoate - -      HAIRDRESSER Series         No Substance 2 days 4 days remarks   122 P-Phenylenediamin  -  -    123 P-Toluenediamine Sulphate  -  -    124 Ammonium Thioglycolate - -    125 Ammonium Persulfate - -    126 Resorcinol - -    127 3-Aminophenol - -    128 P-Aminophenol - -    129 Glyceryl Monothioglycolate - -    130 Zinc Pyrithione  - -    131 Pyrogallol - -    132 P-Aminodiphenylamine - -    133 Dimethylaminopropylamin (DMAPA) - -      RUBBER CHEMICALS         No Substance 2 days 4 days remarks    Carbamate      134 Zinc Bis ( Diethyldithio carbamate ) (ZDEC) - -    135 Zinc Bis (Dibutyldithiocarbamate) - -    136 1,3-Diphenylguanidine (DPG) - -     Thiourea      137 Dibutylthiourea - -    138 Diphenyltiourea - -    139 Thiourea - -     Mercapto chemicals      140 Morpholinyl Mercaptobenzothiazole - -    141 O-Jousjlkubs-2-Benzothiazyl-Sulfenamide - -    142 Dibenzothiazyl Disulfide - -     Thiuram chemicals      143 Dipentamethylenethiuram Disulfide - -    144 Tetraethylthiuram Disulfide (Disulfiram) - -    145 Tetramethylthiuram Disulfide - -    146 Tetramethyl Thiuram Monosulfide (TMTM) - -     4-Phenylenediamine derivatives      147 N-Isopropyl-N'-Phenyl-P-Phenylenediamine (IPPD) - -    148 Lpoidjay-D-Zdngeutvrkjejfga (DPPD) - -     Various Rubber Additives      149 Hydroquinone Monobenzylether  - -    150 Hexamethylenetetramine - -    151 4,4'-Dihydroxybiphenyl - -    152  Cyclohexylthiophtalimide - -    153 Ethylenediamine Dihydrochloride - -    154 N-Phenyl-B-Naphthylamine - -    155 Dodecyl Mercaptan - -        PLASTICS         No Substance 2 days 4 days remarks    Acrylates - -    156 2-Hydroxyethyl Methacrylate (HEMA) - -    157 1,4-Butandioldimethacrylate (BUDMA) - -    158  2-Ethylhexyl Acrylate - -    159 Bisphenol-A-Dimethacrylate  - -    160 Diurethane-Dimethacrylate - -    161 Ethyleneglycoldimethacrylate (EGDMA) - -    162 Pentaerythritoltriacrylate (KACIE) - -    163 Triethylene Glycol Dimethacrylate (TEGDMA) - -     Synthetic material/additives       164 G-Vdwk-Jdntorrkpnc - -    165 Tricresyl Phosphate - -    166 0-Gbnz-Juehtawokrzfu - -    167 Bis (2-Ethylhexyl) Phthalate - -    168 Dibutylphthalate - -    169 Dimethylphthalate - -    170 Toluene-2,4-Diisocyanate - -    171 Diphenylmethane-4,4''-Diisocyanate - -     EPOXY RESIN SYSTEMS       Reactive Solvents - -    172 Cresyl Glycidyl Ether - -    173 Butyl Glycidyl Ether - -    174 Phenyl Glycidyl Ether - -    175 1,4-Butanediol Diglycidyl Ether - -    176 1,6-Hexanediole Diglycidyl Ether - -     Hardener / Accelerator - -    177 Ethylenediamine Dihydrochloride - -    178 Triethylenetetramine - -    179 Diethylenetriamine - -    180 Isophorone Diamine (IPD) - -    181 N,N-Dimethyl-P-Toluidine - -      METALS (Implants / Dental)         No Substance 2 days 4 days remarks   182 Gold Sodium Thiosulfate - - 10     Results of patch tests:                         Interpretation:    - Negative                    A    = Allergic      (+) Erythema    TI   = Toxic/irritant   + E + Infiltration    RaP = Relevance at Present     ++ E/I + Papulovesicle   Rpr  = Relevance Previously     +++ E/I/P + Blister     nR   = No Relevance

## 2019-09-16 ENCOUNTER — OFFICE VISIT (OUTPATIENT)
Dept: ALLERGY | Facility: CLINIC | Age: 24
End: 2019-09-16
Payer: COMMERCIAL

## 2019-09-16 DIAGNOSIS — L23.9 ALLERGIC CONTACT DERMATITIS, UNSPECIFIED TRIGGER: Primary | ICD-10-CM

## 2019-09-16 ASSESSMENT — PAIN SCALES - GENERAL: PAINLEVEL: NO PAIN (0)

## 2019-09-16 NOTE — LETTER
9/16/2019         RE: Lynne Cortez  [address]  [place] [address]        Dear Colleague,    Thank you for referring your patient, Lynne Cortez, to the [facility] ALLERGY. Please see a copy of my visit note below.    [facility] Allergy Note    Allergy Clinic  [facility]  [address] [place] [id number]    Dermatology Problem List:  1. Contact dermatitis  - Unclear triggers but suspected aerosolized given distribution. 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 Has been previously tested by outsider Allergy group. Told that she has a contact dermatitis to Gold and PPD (possibly MCI).   - Current Tx: mometasone cream and tacrolimus  -patch testing 9/16/19    Encounter Date: Sep 16, 2019    CC:   Consult for allergic contact dermatitis    History of Present Illness:  Ms. Lynne Cortez is a 24 year old female with past dermatologic history listed above who presents in self referral for further evaluation of suspected contact dermatitis. Presents with her mom to clinic. Endorses that 6 years ago, she began a new job as hairdresser. About a year afterwards, began noting a hand rash. Described as red, intensely itchy, and scaly. Continued work but had to change her job to avoid suspected triggers (hair coloring products). Over the last 6 months, rash has progressed to involve arms, chest, face, neck, upper back, axillae, and medial thighs. Has been using triamcinolone, elidel, and tacrolimus intermittently.     Of note, went to outside patch testing and was told she has a contact dermatitis to gold and PPD (possibly also MCI)      Past Medical History:   Patient Active Problem List   Diagnosis     ADD (attention deficit disorder) without hyperactivity     IUD (intrauterine device) in place: Judith     Past Medical History:   Diagnosis Date     NO ACTIVE  PROBLEMS      Past Surgical History:   Procedure Laterality Date     C LEVONORGESTREL-RELEASE INTRAUTERINE CONTRACEPTIVE (SAIDA), 13.5 MG  07/19/2017    remove by 7/19/20     NO HISTORY OF SURGERY         Social History:  Patient  reports that she has never smoked. She has never used smokeless tobacco. She reports that she drinks alcohol. She reports that she does not use drugs.    Family History:  Family History   Problem Relation Age of Onset     Cerebrovascular Disease Mother         2 strokes - at age 48     Lipids Mother      Hypertension Father      Lipids Maternal Grandmother      Hypertension Maternal Grandmother      Lipids Maternal Grandfather      Other Cancer Maternal Grandfather         lymphoma     Breast Cancer No family hx of        Medications:  Current Outpatient Medications   Medication Sig Dispense Refill     amphetamine-dextroamphetamine (ADDERALL) 10 MG tablet TAKE 1 TABLET BY MOUTH EVERY MORNING AND 1 TABLET IN THE AFTERNOON IF NEEDED  0     emollient (VANICREAM) external cream Apply topically daily 454 g 11     Levonorgestrel (SAIDA IU)        mometasone (ELOCON) 0.1 % external cream Apply topically daily To red scaly rash. Don't use on your back 50 g 11     montelukast (SINGULAIR) 10 MG tablet Take 1 tablet by mouth daily  5     pimecrolimus (ELIDEL) 1 % external cream Apply thin layer to affected areas twice a day. 30 g 1     UNABLE TO FIND Take 1 tablet by mouth daily MEDICATION NAME: [facility]          Allergies   Allergen Reactions     Gold      Confirmed with patch test.      No Clinical Screening - See Comments      Hair dye and the ingredients.          Review of Systems:  - As per HPI  - Constitutional: Otherwise feeling well today, in usual state of health.  - Skin: As above in HPI. No additional skin concerns.    Physical exam:  Vitals: There were no vitals taken for this visit.  GEN: This is a well developed, well-nourished female in no acute distress, in a pleasant mood.     SKIN: Waist-up skin, which includes the head/face, neck, both arms, chest, back, abdomen, digits and/or nails was examined.  - Lorenzo Skin Type 1  - There are numerous pink scaly papules coalescing into plaques on the face, chest, neck, arms, axillae, and hands with some lichenification and evidence of excoriations.  - No other lesions of concern on areas examined.     Order for PATCH TESTS     [x] Outpatient  [] Inpatient: [facility]..../ Bed ....      Skin Atopy (atopic dermatitis) [] Yes   [x] No  Rhinitis/Sinusitis:   [] Yes   [x] No  Allergic Asthma:   [] Yes   [x] No  Food Allergy:   [] Yes   [x] No  Leg ulcers:   [] Yes   [x] No  Hand eczema:   [x] Yes   [] No   Leading hand:   [x] R   [] L       [] Ambidextrous                      Reason for tests (suspected allergy): persistent contact dermatitis  Known previous allergies: gold and PPD (by outside allergist); possibly MCI    Standardized panels  [x] Standard panel (40 tests)  [x] Preservatives & Antimicrobials (31 tests)  [x] Emulsifiers & Additives (25 tests)   [x] Perfumes/Flavours & Plants (25 tests)  [x] Hairdresser panel (12 tests)  [x] Rubber Chemicals (22 tests)  [x] Plastics (26 tests) hairspray  [] Colorants/Dyes/Food additives (20 tests)  [] Metals (implants/dental) (24 tests)  [] Local anaesthetics/NSAIDs (13 tests)  [] Antibiotics & Antimycotics (14 tests)   [] Corticosteroids (15 tests)   [] Photopatch test (62 tests)   [x] others: gold      [] Patient's own products: ...    DO NOT test if chemical or biological identity is unknown!     always ask from patient the product information and safety sheets (MSDS)     [] Patient needs consultation with Allergy team (changes of tests may apply)  [] Tests discussed with Allergy team (can have direct appointment for patch tests)    RESULTS & EVALUATION of PATCH TESTS    Patch test readings after     [x] 2 days, [] 3 days [x] 4 days, [] 5 days,   Other duration: ...    Applied patch tests with results  (import here the list of patch tests):  Order documented by: Andreea Guerrier CMA  Order reviewed by: Jo Allen LPN   Physician:    Date/time of application:09/16/2019  Localization of application: Back >>> no eczema of the back    STANDARD Series         No Substance 2 days 4 days remarks   1 Isidro Mix [C] - -    2 Colophony - -    3  2-Mercaptobenzothiazole  - -     4 Methylisothiazolinone - -    5 Carba Mix - -    6 Thiuram Mix [A] - -    7 Bisphenol A Epoxy Resin - -    8 Q-Eqsg-Nkfvqzpgwml-Formaldehyde Resin - -    9 Mercapto Mix [A] - -    10 Black Rubber Mix- PPD [B] - -    11 Potassium Dichromate  -  -    12 Balsam of [place] (Myroxylon Pereirae Resin) - -    13 Nickel Sulphate Hexahydrate - -    14 Mixed Dialkyl Thiourea - -    15 Paraben Mix [B] - -    16 Methyldibromo Glutaronitrile - -    17 Fragrance Mix - -    18 2-Bromo-2-Nitropropane-1,3-Diol (Bronopol) - -    19 Lyral - -    20 Tixocortol-21- Pivalate - -    21 Diazolidiyl Urea (Germall II) - -    22 Methyl Methacrylate - -    23 Cobalt (II) Chloride Hexahydrate - -    24 Fragrance Mix II  - -    25 Compositae Mix - -    26 Benzoyl Peroxide - -    27 Bacitracin - -    28 Formaldehyde - -    29 Methylchloroisothiazolinone / Methylisothiazolinone - -    30 Corticosteroid Mix - -    31 Sodium Lauryl Sulfate - -    32 Lanolin Alcohol - -    33 Turpentine - -    34 Cetylstearylalcohol - -    35 Chlorhexidine Dicluconate - -    36 Budenoside - -    37 Imidazolidinyl Urea  - -    38 Ethyl-2 Cyanoacrylate - -    39 Quaternium 15 (Dowicil 200) - -    40 Decyl Glucoside - -      EMULSIFIERS & ADDITIVES        No Substance 2 days 4 days remarks   41 Polyethylene Glycol-400 - -    42 Cocamidopropyl Betaine - -    43 Amerchol L101 - -    44 Propylene Glycol - -    45 Triethanolamine - -    46 Sorbitane Sesquiolate - -    47 Isopropylmyristate - -    48 Polysorbate 80  - -    49 Amidoamine   (Stearamidopropyl Dimethylamine) - -    50 Oleamidopropyl  Dimethylamine - -    51 Lauryl Glucoside - -    52 Coconut Diethanolamide  - -    53 2-Hydroxy-4-Methoxy Benzophenone (Oxybenzone) - -    54 Benzophenone-4 (Sulisobenzon) - -    55 Propolis - -    56 Dexpanthenol - -    57 Carboxymethyl Cellulose Sodium - -    58 Abitol - -    59 Tert-Butylhydroquinone - -    60 Benzyl Salicylate - -     Antioxidant      61 Dodecyl Gallate - -    62 Butylhydroxyanisole (BHA) - -    63 Butylhydroxytoluene (BHT) - -    64 Di-Alpha-Tocopherol (Vit E) - -    65 Propyl Gallate - -      PERFUMES, FLAVORS & PLANTS         No Substance 2 days 4 days remarks   66 Benzyl Salicylate - -    67 Benzyl Cinnamate - -    68 Di-Limonene (Dipentene) - -    69 Cananga Odorata (Tennille Null) (I) - -    70 Lichen Acid Mix - -    71 Mentha Piperita Oil (Peppermint Oil) - -    72 Sesquiterpenelactone mix - -    73 Tea Tree Oil, Oxidized - -    74 Wood Tar Mix - -    75 Abietic Acid - -    76 Lavendula Angustifolia Oil (Lavender Oil) - -    77 Camphor  - -     Fragrance Mix I      78 Oakmoss Absolute - -    79 Eugenol - -    80 Geraniol - -    81 Hydroxycitronellal - -    82 Isoeugenol - -    83 Cinnamic Aldehyde - -    84 Cinnamic Alcohol  - -    85 Sorbitane Sesquioleate - -     Fragrance mix II      86 Citronellol - -    87 Alpha-Hexylcinnamic Aldehyde    - -    88 Citral - -    89 Farnesol - -    90 Coumarin - -      PRESERVATIVES & ANTIMICROBIALS         No Substance 2 days 4 days remarks   91  1,2-Benzisothiazoline-3-One, Sodium Salt - -    92  1,3,5-Patrick (2-Hydroxyethyl) - Hexahydrotriazine (Grotan BK) - -    93 9-Oeioozauhtbur-8-Nitro-1, 3-Propanediol - -    94  3, 4, 4' - Triclocarban - -    95 4 - Chloro - 3 - Cresol - -    96 4 - Chloro - 4 - Xylenol (PCMX) - -    97 7-Ethylbicyclooxazolidine (Bioban TC0230) - -    98 Benzalkonium Chloride - -    99 Benzyl Alcohol - -    100 Cetalkonium Chloride - -    101 Cetylpyrimidine Chloride  - -    102 Chloroacetamide - -    103 DMDM Hydantoin - -    104  Glutaraldehyde - -    105 Triclosan - -    106 Glyoxal Trimeric Dihydrate - -    107 Iodopropynyl Butylcarbamate - -    108 Octylisothiazoline - -    109 Iodoform - -    110 (Nitrobutyl) Morpholine/(Ethylnitro-Trimethylene) Dimorpholine (Bioban P 1487) - -    111 Phenoxyethanol - -    112 Phenyl Salicylate - -    113 Povidone Iodine - -    114 Sodium Benzoate - -    115 Sodium Disulfite - -    116 Sorbic Acid - -    117 Thimerosal - -     Parabens      118 Butyl-P-Hydroxybenzoate - -    119 Ethyl-P-Hydroxybenzoate - -    120 Methyl-P-Hydroxybenzoate - -    121 Propyl-P-Hydroxybenzoate - -      HAIRDRESSER Series         No Substance 2 days 4 days remarks   122 P-Phenylenediamin  -  -    123 P-Toluenediamine Sulphate  -  -    124 Ammonium Thioglycolate - -    125 Ammonium Persulfate - -    126 Resorcinol - -    127 3-Aminophenol - -    128 P-Aminophenol - -    129 Glyceryl Monothioglycolate - -    130 Zinc Pyrithione  - -    131 Pyrogallol - -    132 P-Aminodiphenylamine - -    133 Dimethylaminopropylamin (DMAPA) - -      RUBBER CHEMICALS         No Substance 2 days 4 days remarks    Carbamate      134 Zinc Bis ( Diethyldithio carbamate ) (ZDEC) - -    135 Zinc Bis (Dibutyldithiocarbamate) - -    136 1,3-Diphenylguanidine (DPG) - -     Thiourea      137 Dibutylthiourea - -    138 Diphenyltiourea - -    139 Thiourea - -     Mercapto chemicals      140 Morpholinyl Mercaptobenzothiazole - -    141 D-Ngctyanxny-3-Benzothiazyl-Sulfenamide - -    142 Dibenzothiazyl Disulfide - -     Thiuram chemicals      143 Dipentamethylenethiuram Disulfide - -    144 Tetraethylthiuram Disulfide (Disulfiram) - -    145 Tetramethylthiuram Disulfide - -    146 Tetramethyl Thiuram Monosulfide (TMTM) - -     4-Phenylenediamine derivatives      147 N-Isopropyl-N'-Phenyl-P-Phenylenediamine (IPPD) - -    148 Oshcfajk-R-Zraahiihfgxlzupo (DPPD) - -     Various Rubber Additives      149 Hydroquinone Monobenzylether  - -    150  Hexamethylenetetramine - -    151 4,4'-Dihydroxybiphenyl - -    152 Cyclohexylthiophtalimide - -    153 Ethylenediamine Dihydrochloride - -    154 N-Phenyl-B-Naphthylamine - -    155 Dodecyl Mercaptan - -        PLASTICS         No Substance 2 days 4 days remarks    Acrylates - -    156 2-Hydroxyethyl Methacrylate (HEMA) - -    157 1,4-Butandioldimethacrylate (BUDMA) - -    158  2-Ethylhexyl Acrylate - -    159 Bisphenol-A-Dimethacrylate  - -    160 Diurethane-Dimethacrylate - -    161 Ethyleneglycoldimethacrylate (EGDMA) - -    162 Pentaerythritoltriacrylate (KACIE) - -    163 Triethylene Glycol Dimethacrylate (TEGDMA) - -     Synthetic material/additives       164 Y-Dgzq-Iqzobzwmmvk - -    165 Tricresyl Phosphate - -    166 7-Ukln-Nrmozvfhuptog - -    167 Bis (2-Ethylhexyl) Phthalate - -    168 Dibutylphthalate - -    169 Dimethylphthalate - -    170 Toluene-2,4-Diisocyanate - -    171 Diphenylmethane-4,4''-Diisocyanate - -     EPOXY RESIN SYSTEMS       Reactive Solvents - -    172 Cresyl Glycidyl Ether - -    173 Butyl Glycidyl Ether - -    174 Phenyl Glycidyl Ether - -    175 1,4-Butanediol Diglycidyl Ether - -    176 1,6-Hexanediole Diglycidyl Ether - -     Hardener / Accelerator - -    177 Ethylenediamine Dihydrochloride - -    178 Triethylenetetramine - -    179 Diethylenetriamine - -    180 Isophorone Diamine (IPD) - -    181 N,N-Dimethyl-P-Toluidine - -      METALS (Implants / Dental)         No Substance 2 days 4 days remarks   182 Gold Sodium Thiosulfate - - 10     Results of patch tests:                         Interpretation:    - Negative                    A    = Allergic      (+) Erythema    TI   = Toxic/irritant   + E + Infiltration    RaP = Relevance at Present     ++ E/I + Papulovesicle   Rpr  = Relevance Previously     +++ E/I/P + Blister     nR   = No Relevance    [] No relevant allergic reaction observed    [] Allergic reaction diagnosed against: see later      Interpretation/ remarks:   See  later    [] Patient information given   [] ACSD information   [] SmartP[employer] information    ==> final Diagnosis:      >>> Allergic contact dermatitis  -Based off H&P, presenting condition is most likely allergic contact dermatitis. Trigger is currently unknown but suspected to be in her work environment (aerosolized). Will plan for work avoidance and patch testing within 2-3 weeks  -Pt started patch testing on 9/16/18. She had P-Phenylenediamin placed on the arm in case of high reactivity.     ==> Treatment prescribed/Plan:      Plan for work avoidance and patch testing within 2-3 weeks    Prescribed mometasone cream    Recommended vanicream and Free&Clear shampoo, conditioner, soaps, and detergents    In addition, recommended cessation of anti-histamines for 5 days prior to patch testing     These conclusions are made at the best of ones knowledge and belief  based on the provided evidence such as patients history and allergy test results and they can change over time or can be incomplete because of missing informations.      Follow-up for ongoing patch testing.      Staff Involved:    Scribe Disclosure  I, Ayleen Arguelles, am serving as a scribe to document services personally performed by Dr. Benigno Ruffin, based on data collection and the provider's statements to me.     Start Time: 5:37 PM  End Time: 5:42 PM    I spent a total of 5 min face to face with the patient during today's office visit. About 50% of the time was spent counseling the patient and/or coordinating care regarding their allergy.          Patient had 181 patch tests placed this visit.    Standard panel (40 tests)    Preservatives & Antimicrobials (31 tests)    Emulsifiers & Additives (25 tests)     Perfumes/Flavours & Plants (25 tests)    Hairdresser panel (12 tests)    Rubber Chemicals (22 tests)    Plastics (26 tests)      Again, thank you for allowing me to participate in the care of your patient.        Sincerely,        Benigno Ruffin,  MD

## 2019-09-16 NOTE — PROGRESS NOTES
[facility] Allergy Note    Allergy Clinic  [facility]  [address] [place] [id number]    Dermatology Problem List:  1. Contact dermatitis  - Unclear triggers but suspected aerosolized given distribution. 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 Has been previously tested by outsider [facility]ergy group. Told that she has a contact dermatitis to Gold and PPD (possibly MCI).   - Current Tx: mometasone cream and tacrolimus  -patch testing 9/16/19    Encounter Date: Sep 16, 2019    CC:   Consult for allergic contact dermatitis    History of Present Illness:  Ms. Lynne Cortez is a 24 year old female with past dermatologic history listed above who presents in self referral for further evaluation of suspected contact dermatitis. Presents with her mom to clinic. Endorses that 6 years ago, she began a new job as hairdresser. About a year afterwards, began noting a hand rash. Described as red, intensely itchy, and scaly. Continued work but had to change her job to avoid suspected triggers (hair coloring products). Over the last 6 months, rash has progressed to involve arms, chest, face, neck, upper back, axillae, and medial thighs. Has been using triamcinolone, elidel, and tacrolimus intermittently.     Of note, went to outside patch testing and was told she has a contact dermatitis to gold and PPD (possibly also MCI)      Past Medical History:   Patient Active Problem List   Diagnosis     ADD (attention deficit disorder) without hyperactivity     IUD (intrauterine device) in place: Saida     Past Medical History:   Diagnosis Date     NO ACTIVE PROBLEMS      Past Surgical History:   Procedure Laterality Date     C LEVONORGESTREL-RELEASE INTRAUTERINE CONTRACEPTIVE (SAIDA), 13.5 MG  07/19/2017    remove by 7/19/20     NO HISTORY OF SURGERY         Social History:  Patient  reports  that she has never smoked. She has never used smokeless tobacco. She reports that she drinks alcohol. She reports that she does not use drugs.    Family History:  Family History   Problem Relation Age of Onset     Cerebrovascular Disease Mother         2 strokes - at age 48     Lipids Mother      Hypertension Father      Lipids Maternal Grandmother      Hypertension Maternal Grandmother      Lipids Maternal Grandfather      Other Cancer Maternal Grandfather         lymphoma     Breast Cancer No family hx of        Medications:  Current Outpatient Medications   Medication Sig Dispense Refill     amphetamine-dextroamphetamine (ADDERALL) 10 MG tablet TAKE 1 TABLET BY MOUTH EVERY MORNING AND 1 TABLET IN THE AFTERNOON IF NEEDED  0     emollient (VANICREAM) external cream Apply topically daily 454 g 11     Levonorgestrel (SAIDA IU)        mometasone (ELOCON) 0.1 % external cream Apply topically daily To red scaly rash. Don't use on your back 50 g 11     montelukast (SINGULAIR) 10 MG tablet Take 1 tablet by mouth daily  5     pimecrolimus (ELIDEL) 1 % external cream Apply thin layer to affected areas twice a day. 30 g 1     UNABLE TO FIND Take 1 tablet by mouth daily MEDICATION NAME: [facility]          Allergies   Allergen Reactions     Gold      Confirmed with patch test.      No Clinical Screening - See Comments      Hair dye and the ingredients.          Review of Systems:  - As per HPI  - Constitutional: Otherwise feeling well today, in usual state of health.  - Skin: As above in HPI. No additional skin concerns.    Physical exam:  Vitals: There were no vitals taken for this visit.  GEN: This is a well developed, well-nourished female in no acute distress, in a pleasant mood.    SKIN: Waist-up skin, which includes the head/face, neck, both arms, chest, back, abdomen, digits and/or nails was examined.  - Lorenzo Skin Type 1  - There are numerous pink scaly papules coalescing into plaques on the face, chest,  neck, arms, axillae, and hands with some lichenification and evidence of excoriations.  - No other lesions of concern on areas examined.     Order for PATCH TESTS     [x] Outpatient  [] Inpatient: [facility]..../ Bed ....      Skin Atopy (atopic dermatitis) [] Yes   [x] No  Rhinitis/Sinusitis:   [] Yes   [x] No  Allergic Asthma:   [] Yes   [x] No  Food Allergy:   [] Yes   [x] No  Leg ulcers:   [] Yes   [x] No  Hand eczema:   [x] Yes   [] No   Leading hand:   [x] R   [] L       [] Ambidextrous                      Reason for tests (suspected allergy): persistent contact dermatitis  Known previous allergies: gold and PPD (by outside allergist); possibly MCI    Standardized panels  [x] Standard panel (40 tests)  [x] Preservatives & Antimicrobials (31 tests)  [x] Emulsifiers & Additives (25 tests)   [x] Perfumes/Flavours & Plants (25 tests)  [x] Hairdresser panel (12 tests)  [x] Rubber Chemicals (22 tests)  [x] Plastics (26 tests) hairspray  [] Colorants/Dyes/Food additives (20 tests)  [] Metals (implants/dental) (24 tests)  [] Local anaesthetics/NSAIDs (13 tests)  [] Antibiotics & Antimycotics (14 tests)   [] Corticosteroids (15 tests)   [] Photopatch test (62 tests)   [x] others: gold      [] Patient's own products: ...    DO NOT test if chemical or biological identity is unknown!     always ask from patient the product information and safety sheets (MSDS)     [] Patient needs consultation with Allergy team (changes of tests may apply)  [] Tests discussed with Allergy team (can have direct appointment for patch tests)    RESULTS & EVALUATION of PATCH TESTS    Patch test readings after     [x] 2 days, [] 3 days [x] 4 days, [] 5 days,   Other duration: ...    Applied patch tests with results (import here the list of patch tests):  Order documented by: Andreea Guerrier CMA  Order reviewed by: Jo Allen LPN   Physician:    Date/time of application:09/16/2019  Localization of application: Back >>> no eczema of  the back    STANDARD Series         No Substance 2 days 4 days remarks   1 Isidro Mix [C] - -    2 Colophony - -    3  2-Mercaptobenzothiazole  - -     4 Methylisothiazolinone - -    5 Carba Mix - -    6 Thiuram Mix [A] - -    7 Bisphenol A Epoxy Resin - -    8 D-Uqff-Vqueavfjicg-Formaldehyde Resin - -    9 Mercapto Mix [A] - -    10 Black Rubber Mix- PPD [B] - -    11 Potassium Dichromate  -  -    12 Balsam of [place] (Myroxylon Pereirae Resin) - -    13 Nickel Sulphate Hexahydrate - -    14 Mixed Dialkyl Thiourea - -    15 Paraben Mix [B] - -    16 Methyldibromo Glutaronitrile - -    17 Fragrance Mix - -    18 2-Bromo-2-Nitropropane-1,3-Diol (Bronopol) - -    19 Lyral - -    20 Tixocortol-21- Pivalate - -    21 Diazolidiyl Urea (Germall II) - -    22 Methyl Methacrylate - -    23 Cobalt (II) Chloride Hexahydrate - -    24 Fragrance Mix II  - -    25 Compositae Mix - -    26 Benzoyl Peroxide - -    27 Bacitracin - -    28 Formaldehyde - -    29 Methylchloroisothiazolinone / Methylisothiazolinone - -    30 Corticosteroid Mix - -    31 Sodium Lauryl Sulfate - -    32 Lanolin Alcohol - -    33 Turpentine - -    34 Cetylstearylalcohol - -    35 Chlorhexidine Dicluconate - -    36 Budenoside - -    37 Imidazolidinyl Urea  - -    38 Ethyl-2 Cyanoacrylate - -    39 Quaternium 15 (Dowicil 200) - -    40 Decyl Glucoside - -      EMULSIFIERS & ADDITIVES        No Substance 2 days 4 days remarks   41 Polyethylene Glycol-400 - -    42 Cocamidopropyl Betaine - -    43 Amerchol L101 - -    44 Propylene Glycol - -    45 Triethanolamine - -    46 Sorbitane Sesquiolate - -    47 Isopropylmyristate - -    48 Polysorbate 80  - -    49 Amidoamine   (Stearamidopropyl Dimethylamine) - -    50 Oleamidopropyl Dimethylamine - -    51 Lauryl Glucoside - -    52 Coconut Diethanolamide  - -    53 2-Hydroxy-4-Methoxy Benzophenone (Oxybenzone) - -    54 Benzophenone-4 (Sulisobenzon) - -    55 Propolis - -    56 Dexpanthenol - -    57 Carboxymethyl  Cellulose Sodium - -    58 Abitol - -    59 Tert-Butylhydroquinone - -    60 Benzyl Salicylate - -     Antioxidant      61 Dodecyl Gallate - -    62 Butylhydroxyanisole (BHA) - -    63 Butylhydroxytoluene (BHT) - -    64 Di-Alpha-Tocopherol (Vit E) - -    65 Propyl Gallate - -      PERFUMES, FLAVORS & PLANTS         No Substance 2 days 4 days remarks   66 Benzyl Salicylate - -    67 Benzyl Cinnamate - -    68 Di-Limonene (Dipentene) - -    69 Cananga Odorata (Tennille Null) (I) - -    70 Lichen Acid Mix - -    71 Mentha Piperita Oil (Peppermint Oil) - -    72 Sesquiterpenelactone mix - -    73 Tea Tree Oil, Oxidized - -    74 Wood Tar Mix - -    75 Abietic Acid - -    76 Lavendula Angustifolia Oil (Lavender Oil) - -    77 Camphor  - -     Fragrance Mix I      78 Oakmoss Absolute - -    79 Eugenol - -    80 Geraniol - -    81 Hydroxycitronellal - -    82 Isoeugenol - -    83 Cinnamic Aldehyde - -    84 Cinnamic Alcohol  - -    85 Sorbitane Sesquioleate - -     Fragrance mix II      86 Citronellol - -    87 Alpha-Hexylcinnamic Aldehyde    - -    88 Citral - -    89 Farnesol - -    90 Coumarin - -      PRESERVATIVES & ANTIMICROBIALS         No Substance 2 days 4 days remarks   91  1,2-Benzisothiazoline-3-One, Sodium Salt - -    92  1,3,5-Patrick (2-Hydroxyethyl) - Hexahydrotriazine (Grotan BK) - -    93 5-Ehlqpeayvwpzl-5-Nitro-1, 3-Propanediol - -    94  3, 4, 4' - Triclocarban - -    95 4 - Chloro - 3 - Cresol - -    96 4 - Chloro - 4 - Xylenol (PCMX) - -    97 7-Ethylbicyclooxazolidine (Bioban [id number]) - -    98 Benzalkonium Chloride - -    99 Benzyl Alcohol - -    100 Cetalkonium Chloride - -    101 Cetylpyrimidine Chloride  - -    102 Chloroacetamide - -    103 DMDM Hydantoin - -    104 Glutaraldehyde - -    105 Triclosan - -    106 Glyoxal Trimeric Dihydrate - -    107 Iodopropynyl Butylcarbamate - -    108 Octylisothiazoline - -    109 Iodoform - -    110 (Nitrobutyl) Morpholine/(Ethylnitro-Trimethylene) Dimorpholine  (Bioban P 1487) - -    111 Phenoxyethanol - -    112 Phenyl Salicylate - -    113 Povidone Iodine - -    114 Sodium Benzoate - -    115 Sodium Disulfite - -    116 Sorbic Acid - -    117 Thimerosal - -     Parabens      118 Butyl-P-Hydroxybenzoate - -    119 Ethyl-P-Hydroxybenzoate - -    120 Methyl-P-Hydroxybenzoate - -    121 Propyl-P-Hydroxybenzoate - -      HAIRDRESSER Series         No Substance 2 days 4 days remarks   122 P-Phenylenediamin  -  -    123 P-Toluenediamine Sulphate  -  -    124 Ammonium Thioglycolate - -    125 Ammonium Persulfate - -    126 Resorcinol - -    127 3-Aminophenol - -    128 P-Aminophenol - -    129 Glyceryl Monothioglycolate - -    130 Zinc Pyrithione  - -    131 Pyrogallol - -    132 P-Aminodiphenylamine - -    133 Dimethylaminopropylamin (DMAPA) - -      RUBBER CHEMICALS         No Substance 2 days 4 days remarks    Carbamate      134 Zinc Bis ( Diethyldithio carbamate ) (ZDEC) - -    135 Zinc Bis (Dibutyldithiocarbamate) - -    136 1,3-Diphenylguanidine (DPG) - -     Thiourea      137 Dibutylthiourea - -    138 Diphenyltiourea - -    139 Thiourea - -     Mercapto chemicals      140 Morpholinyl Mercaptobenzothiazole - -    141 F-Xglxgaekdw-1-Benzothiazyl-Sulfenamide - -    142 Dibenzothiazyl Disulfide - -     Thiuram chemicals      143 Dipentamethylenethiuram Disulfide - -    144 Tetraethylthiuram Disulfide (Disulfiram) - -    145 Tetramethylthiuram Disulfide - -    146 Tetramethyl Thiuram Monosulfide (TMTM) - -     4-Phenylenediamine derivatives      147 N-Isopropyl-N'-Phenyl-P-Phenylenediamine (IPPD) - -    148 Hmupazpx-L-Xdesgrgvxkefjkls (DPPD) - -     Various Rubber Additives      149 Hydroquinone Monobenzylether  - -    150 Hexamethylenetetramine - -    151 4,4'-Dihydroxybiphenyl - -    152 Cyclohexylthiophtalimide - -    153 Ethylenediamine Dihydrochloride - -    154 N-Phenyl-B-Naphthylamine - -    155 Dodecyl Mercaptan - -        PLASTICS         No Substance 2 days 4 days  remarks    Acrylates - -    156 2-Hydroxyethyl Methacrylate (HEMA) - -    157 1,4-Butandioldimethacrylate (BUDMA) - -    158  2-Ethylhexyl Acrylate - -    159 Bisphenol-A-Dimethacrylate  - -    160 Diurethane-Dimethacrylate - -    161 Ethyleneglycoldimethacrylate (EGDMA) - -    162 Pentaerythritoltriacrylate (KACIE) - -    163 Triethylene Glycol Dimethacrylate (TEGDMA) - -     Synthetic material/additives       164 Y-Ifyq-Qnmnexdkybh - -    165 Tricresyl Phosphate - -    166 6-Bunz-Mxbjydgehjsnb - -    167 Bis (2-Ethylhexyl) Phthalate - -    168 Dibutylphthalate - -    169 Dimethylphthalate - -    170 Toluene-2,4-Diisocyanate - -    171 Diphenylmethane-4,4''-Diisocyanate - -     EPOXY RESIN SYSTEMS       Reactive Solvents - -    172 Cresyl Glycidyl Ether - -    173 Butyl Glycidyl Ether - -    174 Phenyl Glycidyl Ether - -    175 1,4-Butanediol Diglycidyl Ether - -    176 1,6-Hexanediole Diglycidyl Ether - -     Hardener / Accelerator - -    177 Ethylenediamine Dihydrochloride - -    178 Triethylenetetramine - -    179 Diethylenetriamine - -    180 Isophorone Diamine (IPD) - -    181 N,N-Dimethyl-P-Toluidine - -      METALS (Implants / Dental)         No Substance 2 days 4 days remarks   182 Gold Sodium Thiosulfate - - 10     Results of patch tests:                         Interpretation:    - Negative                    A    = Allergic      (+) Erythema    TI   = Toxic/irritant   + E + Infiltration    RaP = Relevance at Present     ++ E/I + Papulovesicle   Rpr  = Relevance Previously     +++ E/I/P + Blister     nR   = No Relevance    [] No relevant allergic reaction observed    [] Allergic reaction diagnosed against: see later      Interpretation/ remarks:   See later    [] Patient information given   [] [facility] information   [] [employer] information    ==> final Diagnosis:      >>> Allergic contact dermatitis  -Based off H&P, presenting condition is most likely allergic contact dermatitis. Trigger is currently  unknown but suspected to be in her work environment (aerosolized). Will plan for work avoidance and patch testing within 2-3 weeks  -Pt started patch testing on 9/16/18. She had P-Phenylenediamin placed on the arm in case of high reactivity.     ==> Treatment prescribed/Plan:      Plan for work avoidance and patch testing within 2-3 weeks    Prescribed mometasone cream    Recommended vanicream and Free&Clear shampoo, conditioner, soaps, and detergents    In addition, recommended cessation of anti-histamines for 5 days prior to patch testing     These conclusions are made at the best of ones knowledge and belief  based on the provided evidence such as patients history and allergy test results and they can change over time or can be incomplete because of missing informations.      Follow-up for ongoing patch testing.      Staff Involved:    Scribe Disclosure  I, Ayleen Arguelles, am serving as a scribe to document services personally performed by Dr. Benigno Ruffin, based on data collection and the provider's statements to me.     Start Time: 5:37 PM  End Time: 5:42 PM    I spent a total of 5 min face to face with the patient during today's office visit. About 50% of the time was spent counseling the patient and/or coordinating care regarding their allergy.

## 2019-09-16 NOTE — NURSING NOTE
Dermatology Rooming Note    Lynne Cortez's goals for this visit include:   Chief Complaint   Patient presents with     Allergies     Lynne is here for allergy testing day 1     Andreea Guerrier, CMA

## 2019-09-18 ENCOUNTER — OFFICE VISIT (OUTPATIENT)
Dept: ALLERGY | Facility: CLINIC | Age: 24
End: 2019-09-18
Payer: COMMERCIAL

## 2019-09-18 DIAGNOSIS — L23.9 ALLERGIC CONTACT DERMATITIS, UNSPECIFIED TRIGGER: Primary | ICD-10-CM

## 2019-09-18 ASSESSMENT — PAIN SCALES - GENERAL: PAINLEVEL: NO PAIN (0)

## 2019-09-18 NOTE — PROGRESS NOTES
[facility] Allergy Note  Allergy Clinic  [facility]  [address] [place] [id number]    Dermatology Problem List:  1. Contact dermatitis  - Unclear triggers but suspected aerosolized given distribution. 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 Has been previously tested by outsider [facility]ergy group. Told that she has a contact dermatitis to Gold and PPD (possibly MCI).   - Current Tx: mometasone cream and tacrolimus  -patch testing 9/16/19    Encounter Date: Sep 18, 2019    CC:   Consult for allergic contact dermatitis    History of Present Illness:  Ms. Lynne Cortez is a 24 year old female with past dermatologic history listed above who presents in self referral for further evaluation of suspected contact dermatitis. Presents with her mom to clinic. Endorses that 6 years ago, she began a new job as hairdresser. About a year afterwards, began noting a hand rash. Described as red, intensely itchy, and scaly. Continued work but had to change her job to avoid suspected triggers (hair coloring products). Over the last 6 months, rash has progressed to involve arms, chest, face, neck, upper back, axillae, and medial thighs. Has been using triamcinolone, elidel, and tacrolimus intermittently.     Of note, went to outside patch testing and was told she has a contact dermatitis to gold and PPD (possibly also MCI)      Past Medical History:   Patient Active Problem List   Diagnosis     ADD (attention deficit disorder) without hyperactivity     IUD (intrauterine device) in place: Saida     Past Medical History:   Diagnosis Date     NO ACTIVE PROBLEMS      Past Surgical History:   Procedure Laterality Date     C LEVONORGESTREL-RELEASE INTRAUTERINE CONTRACEPTIVE (SAIDA), 13.5 MG  07/19/2017    remove by 7/19/20     NO HISTORY OF SURGERY         Social History:  Patient  reports  that she has never smoked. She has never used smokeless tobacco. She reports that she drinks alcohol. She reports that she does not use drugs.    Family History:  Family History   Problem Relation Age of Onset     Cerebrovascular Disease Mother         2 strokes - at age 48     Lipids Mother      Hypertension Father      Lipids Maternal Grandmother      Hypertension Maternal Grandmother      Lipids Maternal Grandfather      Other Cancer Maternal Grandfather         lymphoma     Breast Cancer No family hx of        Medications:  Current Outpatient Medications   Medication Sig Dispense Refill     amphetamine-dextroamphetamine (ADDERALL) 10 MG tablet TAKE 1 TABLET BY MOUTH EVERY MORNING AND 1 TABLET IN THE AFTERNOON IF NEEDED  0     emollient (VANICREAM) external cream Apply topically daily 454 g 11     Levonorgestrel (SAIDA IU)        mometasone (ELOCON) 0.1 % external cream Apply topically daily To red scaly rash. Don't use on your back 50 g 11     montelukast (SINGULAIR) 10 MG tablet Take 1 tablet by mouth daily  5     pimecrolimus (ELIDEL) 1 % external cream Apply thin layer to affected areas twice a day. 30 g 1     UNABLE TO FIND Take 1 tablet by mouth daily MEDICATION NAME: [facility]          Allergies   Allergen Reactions     Gold      Confirmed with patch test.      No Clinical Screening - See Comments      Hair dye and the ingredients.          Review of Systems:  - As per HPI  - Constitutional: Otherwise feeling well today, in usual state of health.  - Skin: As above in HPI. No additional skin concerns.    Physical exam:  Vitals: There were no vitals taken for this visit.  GEN: This is a well developed, well-nourished female in no acute distress, in a pleasant mood.    SKIN: Waist-up skin, which includes the head/face, neck, both arms, chest, back, abdomen, digits and/or nails was examined.  - Lorenzo Skin Type 1  - There are numerous pink scaly papules coalescing into plaques on the face, chest,  neck, arms, axillae, and hands with some lichenification and evidence of excoriations.  - No other lesions of concern on areas examined.     Order for PATCH TESTS     [x] Outpatient  [] Inpatient: [facility]..../ Bed ....      Skin Atopy (atopic dermatitis) [] Yes   [x] No  Rhinitis/Sinusitis:   [] Yes   [x] No  Allergic Asthma:   [] Yes   [x] No  Food Allergy:   [] Yes   [x] No  Leg ulcers:   [] Yes   [x] No  Hand eczema:   [x] Yes   [] No   Leading hand:   [x] R   [] L       [] Ambidextrous                      Reason for tests (suspected allergy): persistent contact dermatitis  Known previous allergies: gold and PPD (by outside allergist); possibly MCI    Standardized panels  [x] Standard panel (40 tests)  [x] Preservatives & Antimicrobials (31 tests)  [x] Emulsifiers & Additives (25 tests)   [x] Perfumes/Flavours & Plants (25 tests)  [x] Hairdresser panel (12 tests)  [x] Rubber Chemicals (22 tests)  [x] Plastics (26 tests) hairspray  [] Colorants/Dyes/Food additives (20 tests)  [] Metals (implants/dental) (24 tests)   [] Local anaesthetics/NSAIDs (13 tests)  [] Antibiotics & Antimycotics (14 tests)   [] Corticosteroids (15 tests)   [] Photopatch test (62 tests)   [x] others: gold      [] Patient's own products: ...    DO NOT test if chemical or biological identity is unknown!     always ask from patient the product information and safety sheets (MSDS)     [] Patient needs consultation with Allergy team (changes of tests may apply)  [] Tests discussed with Allergy team (can have direct appointment for patch tests)    RESULTS & EVALUATION of PATCH TESTS    Patch test readings after     [x] 2 days, [] 3 days [x] 4 days, [] 5 days,   Other duration: ...    Applied patch tests with results (import here the list of patch tests):  Order documented by: Andreea Guerrier CMA  Order reviewed by: Jo Allen LPN   Physician:    Date/time of application:09/16/2019  Localization of application: Back >>> no eczema of  the back    STANDARD Series         No Substance 2 days 4 days remarks   1 Isidro Mix [C] - -    2 Colophony - -    3  2-Mercaptobenzothiazole  - -     4 Methylisothiazolinone (+) -    5 Carba Mix - -    6 Thiuram Mix [A] - -    7 Bisphenol A Epoxy Resin - -    8 D-Nayr-Whvtrvkzatx-Formaldehyde Resin - -    9 Mercapto Mix [A] - -    10 Black Rubber Mix- PPD [B] - -    11 Potassium Dichromate  -  -    12 Balsam of [place] (Myroxylon Pereirae Resin) - -    13 Nickel Sulphate Hexahydrate - -    14 Mixed Dialkyl Thiourea - -    15 Paraben Mix [B] - -    16 Methyldibromo Glutaronitrile - -    17 Fragrance Mix - -    18 2-Bromo-2-Nitropropane-1,3-Diol (Bronopol) - -    19 Lyral - -    20 Tixocortol-21- Pivalate - -    21 Diazolidiyl Urea (Germall II) - -    22 Methyl Methacrylate - -    23 Cobalt (II) Chloride Hexahydrate - -    24 Fragrance Mix II  - -    25 Compositae Mix - -    26 Benzoyl Peroxide - -    27 Bacitracin - -    28 Formaldehyde - -    29 Methylchloroisothiazolinone / Methylisothiazolinone - -    30 Corticosteroid Mix - -    31 Sodium Lauryl Sulfate - -    32 Lanolin Alcohol - -    33 Turpentine - -    34 Cetylstearylalcohol - -    35 Chlorhexidine Dicluconate - -    36 Budenoside - -    37 Imidazolidinyl Urea  - -    38 Ethyl-2 Cyanoacrylate - -    39 Quaternium 15 (Dowicil 200) - -    40 Decyl Glucoside - -      EMULSIFIERS & ADDITIVES        No Substance 2 days 4 days remarks   41 Polyethylene Glycol-400 - -    42 Cocamidopropyl Betaine - -    43 Amerchol L101 - -    44 Propylene Glycol - -    45 Triethanolamine - -    46 Sorbitane Sesquiolate - -    47 Isopropylmyristate - -    48 Polysorbate 80  - -    49 Amidoamine   (Stearamidopropyl Dimethylamine) - -    50 Oleamidopropyl Dimethylamine - -    51 Lauryl Glucoside - -    52 Coconut Diethanolamide  - -    53 2-Hydroxy-4-Methoxy Benzophenone (Oxybenzone) - -    54 Benzophenone-4 (Sulisobenzon) + -    55 Propolis - -    56 Dexpanthenol - -    57  Carboxymethyl Cellulose Sodium - -    58 Abitol - -    59 Tert-Butylhydroquinone - -    60 Benzyl Salicylate - -     Antioxidant      61 Dodecyl Gallate - -    62 Butylhydroxyanisole (BHA) - -    63 Butylhydroxytoluene (BHT) - -    64 Di-Alpha-Tocopherol (Vit E) + -    65 Propyl Gallate - -      PERFUMES, FLAVORS & PLANTS         No Substance 2 days 4 days remarks   66 Benzyl Salicylate - -    67 Benzyl Cinnamate - -    68 Di-Limonene (Dipentene) - -    69 Cananga Odorata (Tennille Null) (I) - -    70 Lichen Acid Mix - -    71 Mentha Piperita Oil (Peppermint Oil) - -    72 Sesquiterpenelactone mix - -    73 Tea Tree Oil, Oxidized - -    74 Wood Tar Mix - -    75 Abietic Acid - -    76 Lavendula Angustifolia Oil (Lavender Oil) - -    77 Camphor  - -     Fragrance Mix I      78 Oakmoss Absolute - -    79 Eugenol - -    80 Geraniol - -    81 Hydroxycitronellal - -    82 Isoeugenol - -    83 Cinnamic Aldehyde (+) -    84 Cinnamic Alcohol  - -    85 Sorbitane Sesquioleate - -     Fragrance mix II      86 Citronellol - -    87 Alpha-Hexylcinnamic Aldehyde    - -    88 Citral - -    89 Farnesol - -    90 Coumarin - -      PRESERVATIVES & ANTIMICROBIALS         No Substance 2 days 4 days remarks   91  1,2-Benzisothiazoline-3-One, Sodium Salt - -    92  1,3,5-Patrick (2-Hydroxyethyl) - Hexahydrotriazine (Grotan BK) - -    93 1-Ohvkhkfrbwamo-7-Nitro-1, 3-Propanediol - -    94  3, 4, 4' - Triclocarban - -    95 4 - Chloro - 3 - Cresol - -    96 4 - Chloro - 4 - Xylenol (PCMX) - -    97 7-Ethylbicyclooxazolidine (Bioban QM3536) - -    98 Benzalkonium Chloride - -    99 Benzyl Alcohol - -    100 Cetalkonium Chloride - -    101 Cetylpyrimidine Chloride  - -    102 Chloroacetamide - -    103 DMDM Hydantoin - -    104 Glutaraldehyde - -    105 Triclosan - -    106 Glyoxal Trimeric Dihydrate - -    107 Iodopropynyl Butylcarbamate - -    108 Octylisothiazoline - -    109 Iodoform - -    110 (Nitrobutyl)  Morpholine/(Ethylnitro-Trimethylene) Dimorpholine (Bioban P 1487) - -    111 Phenoxyethanol - -    112 Phenyl Salicylate - -    113 Povidone Iodine - -    114 Sodium Benzoate - -    115 Sodium Disulfite - -    116 Sorbic Acid - -    117 Thimerosal - -     Parabens      118 Butyl-P-Hydroxybenzoate - -    119 Ethyl-P-Hydroxybenzoate - -    120 Methyl-P-Hydroxybenzoate - -    121 Propyl-P-Hydroxybenzoate - -      HAIRDRESSER Series         No Substance 2 days 4 days remarks   122 P-Phenylenediamin  ++  -    123 P-Toluenediamine Sulphate  +  -    124 Ammonium Thioglycolate - -    125 Ammonium Persulfate +/++ -    126 Resorcinol - -    127 3-Aminophenol - -    128 P-Aminophenol - -    129 Glyceryl Monothioglycolate - -    130 Zinc Pyrithione  - -    131 Pyrogallol - -    132 P-Aminodiphenylamine - -    133 Dimethylaminopropylamin (DMAPA) - -      RUBBER CHEMICALS         No Substance 2 days 4 days remarks    Carbamate      134 Zinc Bis ( Diethyldithio carbamate ) (ZDEC) - -    135 Zinc Bis (Dibutyldithiocarbamate) - -    136 1,3-Diphenylguanidine (DPG) - -     Thiourea      137 Dibutylthiourea - -    138 Diphenyltiourea - -    139 Thiourea - -     Mercapto chemicals      140 Morpholinyl Mercaptobenzothiazole - -    141 F-Fvhzxemgty-2-Benzothiazyl-Sulfenamide - -    142 Dibenzothiazyl Disulfide - -     Thiuram chemicals      143 Dipentamethylenethiuram Disulfide - -    144 Tetraethylthiuram Disulfide (Disulfiram) - -    145 Tetramethylthiuram Disulfide - -    146 Tetramethyl Thiuram Monosulfide (TMTM) - -     4-Phenylenediamine derivatives      147 N-Isopropyl-N'-Phenyl-P-Phenylenediamine (IPPD) - -    148 Xbcpfjir-D-Kivdalrbohqiiqhj (DPPD) - -     Various Rubber Additives      149 Hydroquinone Monobenzylether  - -    150 Hexamethylenetetramine - -    151 4,4'-Dihydroxybiphenyl - -    152 Cyclohexylthiophtalimide - -    153 Ethylenediamine Dihydrochloride - -    154 N-Phenyl-B-Naphthylamine - -    155 Dodecyl Mercaptan -  -        PLASTICS         No Substance 2 days 4 days remarks    Acrylates - -    156 2-Hydroxyethyl Methacrylate (HEMA) - -    157 1,4-Butandioldimethacrylate (BUDMA) - -    158  2-Ethylhexyl Acrylate - -    159 Bisphenol-A-Dimethacrylate  - -    160 Diurethane-Dimethacrylate - -    161 Ethyleneglycoldimethacrylate (EGDMA) - -    162 Pentaerythritoltriacrylate (KACIE) - -    163 Triethylene Glycol Dimethacrylate (TEGDMA) - -     Synthetic material/additives       164 E-Tmxv-Gyhciywdats - -    165 Tricresyl Phosphate - -    166 9-Iexd-Asmiyvbuqxggk - -    167 Bis (2-Ethylhexyl) Phthalate - -    168 Dibutylphthalate - -    169 Dimethylphthalate - -    170 Toluene-2,4-Diisocyanate - -    171 Diphenylmethane-4,4''-Diisocyanate - -     EPOXY RESIN SYSTEMS       Reactive Solvents - -    172 Cresyl Glycidyl Ether - -    173 Butyl Glycidyl Ether - -    174 Phenyl Glycidyl Ether - -    175 1,4-Butanediol Diglycidyl Ether - -    176 1,6-Hexanediole Diglycidyl Ether - -     Hardener / Accelerator - -    177 Ethylenediamine Dihydrochloride - -    178 Triethylenetetramine - -    179 Diethylenetriamine - -    180 Isophorone Diamine (IPD) - -    181 N,N-Dimethyl-P-Toluidine - -      METALS (Implants / Dental)         No Substance 2 days 4 days remarks   182 Gold Sodium Thiosulfate + - 10     Results of patch tests:                         Interpretation:    - Negative                    A    = Allergic      (+) Erythema    TI   = Toxic/irritant   + E + Infiltration    RaP = Relevance at Present     ++ E/I + Papulovesicle   Rpr  = Relevance Previously     +++ E/I/P + Blister     nR   = No Relevance    [x] Allergic reaction diagnosed against: hair dyes: ++P-Phenylenediamin,++ P-Toluenediamine Sulphate, +/++Ammonium Persulfate  Metals: Gold Sodium Thiosulfate  Additives: + Benzophenone-4 (Sulisobenzon),  +Di-Alpha-Tocopherol (Vit E)    Interpretation/ remarks:   See later    [] Patient information given   [] [facility] information   []  [employer] information    ==> final Diagnosis:      >>> Allergic contact dermatitis  -Based off H&P, presenting condition is most likely allergic contact dermatitis. Trigger is currently unknown but suspected to be in her work environment (aerosolized). Will plan for work avoidance and patch testing within 2-3 weeks  -Pt started patch testing on 9/16/18. She had P-Phenylenediamin placed on the arm in case of high reactivity.     These conclusions are made at the best of ones knowledge and belief  based on the provided evidence such as patients history and allergy test results and they can change over time or can be incomplete because of missing informations.  ==> Treatment prescribed/Plan:    >> discussed with patient about future of occupation as  in this clearly occupational disease (occupational allergic contact dermatitis)      Prescribed mometasone cream    Recommended vanicream and Free&Clear shampoo, conditioner, soaps, and detergent        Staff Involved:    Scribe Disclosure:  I, Elizabeth Holt, am serving as a scribe to document services personally performed by Dr. Benigno Ruffin MD, based on data collection and the provider's statements to me.     I spent a total of 17 minutes face to face with Lynne Cortez during today s office visit. Over 50% of this time was spent counseling the patient and/or coordinating care. Please see Assessment and Plan for details.

## 2019-09-18 NOTE — PROGRESS NOTES
Patient had 181 patch tests placed this visit.    Standard panel (40 tests)    Preservatives & Antimicrobials (31 tests)    Emulsifiers & Additives (25 tests)     Perfumes/Flavours & Plants (25 tests)    Hairdresser panel (12 tests)    Rubber Chemicals (22 tests)    Plastics (26 tests)

## 2019-09-18 NOTE — LETTER
9/18/2019         RE: Lynne Cortez  [address]  [place] [address]        Dear Colleague,    Thank you for referring your patient, Lynne Cortez, to the [facility] ALLERGY. Please see a copy of my visit note below.    [facility] Allergy Note  Allergy Clinic  [facility]  [address] [place] [id number]    Dermatology Problem List:  1. Contact dermatitis  - Unclear triggers but suspected aerosolized given distribution. 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 Has been previously tested by outsider Allergy group. Told that she has a contact dermatitis to Gold and PPD (possibly MCI).   - Current Tx: mometasone cream and tacrolimus  -patch testing 9/16/19    Encounter Date: Sep 18, 2019    CC:   Consult for allergic contact dermatitis    History of Present Illness:  Ms. Lynne Cortez is a 24 year old female with past dermatologic history listed above who presents in self referral for further evaluation of suspected contact dermatitis. Presents with her mom to clinic. Endorses that 6 years ago, she began a new job as hairdresser. About a year afterwards, began noting a hand rash. Described as red, intensely itchy, and scaly. Continued work but had to change her job to avoid suspected triggers (hair coloring products). Over the last 6 months, rash has progressed to involve arms, chest, face, neck, upper back, axillae, and medial thighs. Has been using triamcinolone, elidel, and tacrolimus intermittently.     Of note, went to outside patch testing and was told she has a contact dermatitis to gold and PPD (possibly also MCI)      Past Medical History:   Patient Active Problem List   Diagnosis     ADD (attention deficit disorder) without hyperactivity     IUD (intrauterine device) in place: Judith     Past Medical History:   Diagnosis Date     NO ACTIVE  PROBLEMS      Past Surgical History:   Procedure Laterality Date     C LEVONORGESTREL-RELEASE INTRAUTERINE CONTRACEPTIVE (SAIDA), 13.5 MG  07/19/2017    remove by 7/19/20     NO HISTORY OF SURGERY         Social History:  Patient  reports that she has never smoked. She has never used smokeless tobacco. She reports that she drinks alcohol. She reports that she does not use drugs.    Family History:  Family History   Problem Relation Age of Onset     Cerebrovascular Disease Mother         2 strokes - at age 48     Lipids Mother      Hypertension Father      Lipids Maternal Grandmother      Hypertension Maternal Grandmother      Lipids Maternal Grandfather      Other Cancer Maternal Grandfather         lymphoma     Breast Cancer No family hx of        Medications:  Current Outpatient Medications   Medication Sig Dispense Refill     amphetamine-dextroamphetamine (ADDERALL) 10 MG tablet TAKE 1 TABLET BY MOUTH EVERY MORNING AND 1 TABLET IN THE AFTERNOON IF NEEDED  0     emollient (VANICREAM) external cream Apply topically daily 454 g 11     Levonorgestrel (SAIDA IU)        mometasone (ELOCON) 0.1 % external cream Apply topically daily To red scaly rash. Don't use on your back 50 g 11     montelukast (SINGULAIR) 10 MG tablet Take 1 tablet by mouth daily  5     pimecrolimus (ELIDEL) 1 % external cream Apply thin layer to affected areas twice a day. 30 g 1     UNABLE TO FIND Take 1 tablet by mouth daily MEDICATION NAME: [facility]          Allergies   Allergen Reactions     Gold      Confirmed with patch test.      No Clinical Screening - See Comments      Hair dye and the ingredients.          Review of Systems:  - As per HPI  - Constitutional: Otherwise feeling well today, in usual state of health.  - Skin: As above in HPI. No additional skin concerns.    Physical exam:  Vitals: There were no vitals taken for this visit.  GEN: This is a well developed, well-nourished female in no acute distress, in a pleasant mood.     SKIN: Waist-up skin, which includes the head/face, neck, both arms, chest, back, abdomen, digits and/or nails was examined.  - Lorenzo Skin Type 1  - There are numerous pink scaly papules coalescing into plaques on the face, chest, neck, arms, axillae, and hands with some lichenification and evidence of excoriations.  - No other lesions of concern on areas examined.     Order for PATCH TESTS     [x] Outpatient  [] Inpatient: [facility]..../ Bed ....      Skin Atopy (atopic dermatitis) [] Yes   [x] No  Rhinitis/Sinusitis:   [] Yes   [x] No  Allergic Asthma:   [] Yes   [x] No  Food Allergy:   [] Yes   [x] No  Leg ulcers:   [] Yes   [x] No  Hand eczema:   [x] Yes   [] No   Leading hand:   [x] R   [] L       [] Ambidextrous                      Reason for tests (suspected allergy): persistent contact dermatitis  Known previous allergies: gold and PPD (by outside allergist); possibly MCI    Standardized panels  [x] Standard panel (40 tests)  [x] Preservatives & Antimicrobials (31 tests)  [x] Emulsifiers & Additives (25 tests)   [x] Perfumes/Flavours & Plants (25 tests)  [x] Hairdresser panel (12 tests)  [x] Rubber Chemicals (22 tests)  [x] Plastics (26 tests) hairspray  [] Colorants/Dyes/Food additives (20 tests)  [] Metals (implants/dental) (24 tests)   [] Local anaesthetics/NSAIDs (13 tests)  [] Antibiotics & Antimycotics (14 tests)   [] Corticosteroids (15 tests)   [] Photopatch test (62 tests)   [x] others: gold      [] Patient's own products: ...    DO NOT test if chemical or biological identity is unknown!     always ask from patient the product information and safety sheets (MSDS)     [] Patient needs consultation with Allergy team (changes of tests may apply)  [] Tests discussed with Allergy team (can have direct appointment for patch tests)    RESULTS & EVALUATION of PATCH TESTS    Patch test readings after     [x] 2 days, [] 3 days [x] 4 days, [] 5 days,   Other duration: ...    Applied patch tests with results  (import here the list of patch tests):  Order documented by: Andreea Guerrier CMA  Order reviewed by: Jo Allen LPN   Physician:    Date/time of application:09/16/2019  Localization of application: Back >>> no eczema of the back    STANDARD Series         No Substance 2 days 4 days remarks   1 Isidro Mix [C] - -    2 Colophony - -    3  2-Mercaptobenzothiazole  - -     4 Methylisothiazolinone (+) -    5 Carba Mix - -    6 Thiuram Mix [A] - -    7 Bisphenol A Epoxy Resin - -    8 O-Xhws-Fmppbfswaie-Formaldehyde Resin - -    9 Mercapto Mix [A] - -    10 Black Rubber Mix- PPD [B] - -    11 Potassium Dichromate  -  -    12 Balsam of [place] (Myroxylon Pereirae Resin) - -    13 Nickel Sulphate Hexahydrate - -    14 Mixed Dialkyl Thiourea - -    15 Paraben Mix [B] - -    16 Methyldibromo Glutaronitrile - -    17 Fragrance Mix - -    18 2-Bromo-2-Nitropropane-1,3-Diol (Bronopol) - -    19 Lyral - -    20 Tixocortol-21- Pivalate - -    21 Diazolidiyl Urea (Germall II) - -    22 Methyl Methacrylate - -    23 Cobalt (II) Chloride Hexahydrate - -    24 Fragrance Mix II  - -    25 Compositae Mix - -    26 Benzoyl Peroxide - -    27 Bacitracin - -    28 Formaldehyde - -    29 Methylchloroisothiazolinone / Methylisothiazolinone - -    30 Corticosteroid Mix - -    31 Sodium Lauryl Sulfate - -    32 Lanolin Alcohol - -    33 Turpentine - -    34 Cetylstearylalcohol - -    35 Chlorhexidine Dicluconate - -    36 Budenoside - -    37 Imidazolidinyl Urea  - -    38 Ethyl-2 Cyanoacrylate - -    39 Quaternium 15 (Dowicil 200) - -    40 Decyl Glucoside - -      EMULSIFIERS & ADDITIVES        No Substance 2 days 4 days remarks   41 Polyethylene Glycol-400 - -    42 Cocamidopropyl Betaine - -    43 Amerchol L101 - -    44 Propylene Glycol - -    45 Triethanolamine - -    46 Sorbitane Sesquiolate - -    47 Isopropylmyristate - -    48 Polysorbate 80  - -    49 Amidoamine   (Stearamidopropyl Dimethylamine) - -    50  Oleamidopropyl Dimethylamine - -    51 Lauryl Glucoside - -    52 Coconut Diethanolamide  - -    53 2-Hydroxy-4-Methoxy Benzophenone (Oxybenzone) - -    54 Benzophenone-4 (Sulisobenzon) + -    55 Propolis - -    56 Dexpanthenol - -    57 Carboxymethyl Cellulose Sodium - -    58 Abitol - -    59 Tert-Butylhydroquinone - -    60 Benzyl Salicylate - -     Antioxidant      61 Dodecyl Gallate - -    62 Butylhydroxyanisole (BHA) - -    63 Butylhydroxytoluene (BHT) - -    64 Di-Alpha-Tocopherol (Vit E) + -    65 Propyl Gallate - -      PERFUMES, FLAVORS & PLANTS         No Substance 2 days 4 days remarks   66 Benzyl Salicylate - -    67 Benzyl Cinnamate - -    68 Di-Limonene (Dipentene) - -    69 Cananga Odorata (Tennille Null) (I) - -    70 Lichen Acid Mix - -    71 Mentha Piperita Oil (Peppermint Oil) - -    72 Sesquiterpenelactone mix - -    73 Tea Tree Oil, Oxidized - -    74 Wood Tar Mix - -    75 Abietic Acid - -    76 Lavendula Angustifolia Oil (Lavender Oil) - -    77 Camphor  - -     Fragrance Mix I      78 Oakmoss Absolute - -    79 Eugenol - -    80 Geraniol - -    81 Hydroxycitronellal - -    82 Isoeugenol - -    83 Cinnamic Aldehyde (+) -    84 Cinnamic Alcohol  - -    85 Sorbitane Sesquioleate - -     Fragrance mix II      86 Citronellol - -    87 Alpha-Hexylcinnamic Aldehyde    - -    88 Citral - -    89 Farnesol - -    90 Coumarin - -      PRESERVATIVES & ANTIMICROBIALS         No Substance 2 days 4 days remarks   91  1,2-Benzisothiazoline-3-One, Sodium Salt - -    92  1,3,5-Patrick (2-Hydroxyethyl) - Hexahydrotriazine (Grotan BK) - -    93 9-Gkwezspanhmsl-8-Nitro-1, 3-Propanediol - -    94  3, 4, 4' - Triclocarban - -    95 4 - Chloro - 3 - Cresol - -    96 4 - Chloro - 4 - Xylenol (PCMX) - -    97 7-Ethylbicyclooxazolidine (Bioban [id number]) - -    98 Benzalkonium Chloride - -    99 Benzyl Alcohol - -    100 Cetalkonium Chloride - -    101 Cetylpyrimidine Chloride  - -    102 Chloroacetamide - -    103 DMDM  Hydantoin - -    104 Glutaraldehyde - -    105 Triclosan - -    106 Glyoxal Trimeric Dihydrate - -    107 Iodopropynyl Butylcarbamate - -    108 Octylisothiazoline - -    109 Iodoform - -    110 (Nitrobutyl) Morpholine/(Ethylnitro-Trimethylene) Dimorpholine (Bioban P 1487) - -    111 Phenoxyethanol - -    112 Phenyl Salicylate - -    113 Povidone Iodine - -    114 Sodium Benzoate - -    115 Sodium Disulfite - -    116 Sorbic Acid - -    117 Thimerosal - -     Parabens      118 Butyl-P-Hydroxybenzoate - -    119 Ethyl-P-Hydroxybenzoate - -    120 Methyl-P-Hydroxybenzoate - -    121 Propyl-P-Hydroxybenzoate - -      HAIRDRESSER Series         No Substance 2 days 4 days remarks   122 P-Phenylenediamin  ++  -    123 P-Toluenediamine Sulphate  +  -    124 Ammonium Thioglycolate - -    125 Ammonium Persulfate +/++ -    126 Resorcinol - -    127 3-Aminophenol - -    128 P-Aminophenol - -    129 Glyceryl Monothioglycolate - -    130 Zinc Pyrithione  - -    131 Pyrogallol - -    132 P-Aminodiphenylamine - -    133 Dimethylaminopropylamin (DMAPA) - -      RUBBER CHEMICALS         No Substance 2 days 4 days remarks    Carbamate      134 Zinc Bis ( Diethyldithio carbamate ) (ZDEC) - -    135 Zinc Bis (Dibutyldithiocarbamate) - -    136 1,3-Diphenylguanidine (DPG) - -     Thiourea      137 Dibutylthiourea - -    138 Diphenyltiourea - -    139 Thiourea - -     Mercapto chemicals      140 Morpholinyl Mercaptobenzothiazole - -    141 D-Mimrpheckn-0-Benzothiazyl-Sulfenamide - -    142 Dibenzothiazyl Disulfide - -     Thiuram chemicals      143 Dipentamethylenethiuram Disulfide - -    144 Tetraethylthiuram Disulfide (Disulfiram) - -    145 Tetramethylthiuram Disulfide - -    146 Tetramethyl Thiuram Monosulfide (TMTM) - -     4-Phenylenediamine derivatives      147 N-Isopropyl-N'-Phenyl-P-Phenylenediamine (IPPD) - -    148 Gbuqwhje-X-Kahmkxjfnyxxadko (DPPD) - -     Various Rubber Additives      149 Hydroquinone Monobenzylether  - -     150 Hexamethylenetetramine - -    151 4,4'-Dihydroxybiphenyl - -    152 Cyclohexylthiophtalimide - -    153 Ethylenediamine Dihydrochloride - -    154 N-Phenyl-B-Naphthylamine - -    155 Dodecyl Mercaptan - -        PLASTICS         No Substance 2 days 4 days remarks    Acrylates - -    156 2-Hydroxyethyl Methacrylate (HEMA) - -    157 1,4-Butandioldimethacrylate (BUDMA) - -    158  2-Ethylhexyl Acrylate - -    159 Bisphenol-A-Dimethacrylate  - -    160 Diurethane-Dimethacrylate - -    161 Ethyleneglycoldimethacrylate (EGDMA) - -    162 Pentaerythritoltriacrylate (KACIE) - -    163 Triethylene Glycol Dimethacrylate (TEGDMA) - -     Synthetic material/additives       164 R-Lukx-Wxjswtmopuu - -    165 Tricresyl Phosphate - -    166 3-Autr-Gzfqzallswlgh - -    167 Bis (2-Ethylhexyl) Phthalate - -    168 Dibutylphthalate - -    169 Dimethylphthalate - -    170 Toluene-2,4-Diisocyanate - -    171 Diphenylmethane-4,4''-Diisocyanate - -     EPOXY RESIN SYSTEMS       Reactive Solvents - -    172 Cresyl Glycidyl Ether - -    173 Butyl Glycidyl Ether - -    174 Phenyl Glycidyl Ether - -    175 1,4-Butanediol Diglycidyl Ether - -    176 1,6-Hexanediole Diglycidyl Ether - -     Hardener / Accelerator - -    177 Ethylenediamine Dihydrochloride - -    178 Triethylenetetramine - -    179 Diethylenetriamine - -    180 Isophorone Diamine (IPD) - -    181 N,N-Dimethyl-P-Toluidine - -      METALS (Implants / Dental)         No Substance 2 days 4 days remarks   182 Gold Sodium Thiosulfate + - 10     Results of patch tests:                         Interpretation:    - Negative                    A    = Allergic      (+) Erythema    TI   = Toxic/irritant   + E + Infiltration    RaP = Relevance at Present     ++ E/I + Papulovesicle   Rpr  = Relevance Previously     +++ E/I/P + Blister     nR   = No Relevance    [x] Allergic reaction diagnosed against: hair dyes: ++P-Phenylenediamin,++ P-Toluenediamine Sulphate, +/++Ammonium  Persulfate  Metals: Gold Sodium Thiosulfate  Additives: + Benzophenone-4 (Sulisobenzon),  +Di-Alpha-Tocopherol (Vit E)    Interpretation/ remarks:   See later    [] Patient information given   [] [facility] information   [] [employer] information    ==> final Diagnosis:      >>> Allergic contact dermatitis  -Based off H&P, presenting condition is most likely allergic contact dermatitis. Trigger is currently unknown but suspected to be in her work environment (aerosolized). Will plan for work avoidance and patch testing within 2-3 weeks  -Pt started patch testing on 9/16/18. She had P-Phenylenediamin placed on the arm in case of high reactivity.     These conclusions are made at the best of ones knowledge and belief  based on the provided evidence such as patients history and allergy test results and they can change over time or can be incomplete because of missing informations.  ==> Treatment prescribed/Plan:    >> discussed with patient about future of occupation as  in this clearly occupational disease (occupational allergic contact dermatitis)      Prescribed mometasone cream    Recommended vanicream and Free&Clear shampoo, conditioner, soaps, and detergent        Staff Involved:    Scribe Disclosure:  I, Elizabeth Holt, am serving as a scribe to document services personally performed by Dr. Benigno Ruffin MD, based on data collection and the provider's statements to me.     I spent a total of 17 minutes face to face with Lynne Cortez during today s office visit. Over 50% of this time was spent counseling the patient and/or coordinating care. Please see Assessment and Plan for details.                        Again, thank you for allowing me to participate in the care of your patient.        Sincerely,        Benigno Ruffin MD

## 2019-09-18 NOTE — NURSING NOTE
Dermatology Rooming Note    Lynne Cortez's goals for this visit include:   Chief Complaint   Patient presents with     Allergies     Lynne is here for allergy testing day 3     Andreea Guerrier, CMA

## 2019-09-20 ENCOUNTER — OFFICE VISIT (OUTPATIENT)
Dept: ALLERGY | Facility: CLINIC | Age: 24
End: 2019-09-20
Payer: COMMERCIAL

## 2019-09-20 DIAGNOSIS — L23.2 ALLERGIC CONTACT DERMATITIS DUE TO COSMETICS: Primary | ICD-10-CM

## 2019-09-20 ASSESSMENT — PAIN SCALES - GENERAL: PAINLEVEL: NO PAIN (0)

## 2019-09-20 NOTE — LETTER
9/20/2019         RE: Lynne Cortez  [address]  [place] [address]        Dear Colleague,    Thank you for referring your patient, Lynne Cortez, to the [facility] ALLERGY. Please see a copy of my visit note below.                                [facility] Allergy Note    Allergy Clinic  [facility]  [address] [place] [id number]    Allergy Problem List:  1. Contact dermatitis  - Unclear triggers but suspected aerosolized given distribution. 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 Has been previously tested by outsider [facility]ergy group. Told that she has a contact dermatitis to Gold and PPD (possibly MCI).   - Current Tx: mometasone cream and tacrolimus  -patch testing 9/16/19    Encounter Date: Sep 20, 2019    CC:   Consult for allergic contact dermatitis    History of Present Illness:  Ms. Lynne Cortez is a 24 year old female with past dermatologic history listed above who presents in self referral for further evaluation of suspected contact dermatitis. Presents with her mom to clinic. Endorses that 6 years ago, she began a new job as hairdresser. About a year afterwards, began noting a hand rash. Described as red, intensely itchy, and scaly. Continued work but had to change her job to avoid suspected triggers (hair coloring products). Over the last 6 months, rash has progressed to involve arms, chest, face, neck, upper back, axillae, and medial thighs. Has been using triamcinolone, elidel, and tacrolimus intermittently.     Of note, went to outside patch testing and was told she has a contact dermatitis to gold and PPD (possibly also MCI).      Past Medical History:   Patient Active Problem List   Diagnosis     ADD (attention deficit disorder) without hyperactivity     IUD (intrauterine device) in place: Judith     Past Medical History:    Diagnosis Date     NO ACTIVE PROBLEMS      Past Surgical History:   Procedure Laterality Date     C LEVONORGESTREL-RELEASE INTRAUTERINE CONTRACEPTIVE (SAIDA), 13.5 MG  07/19/2017    remove by 7/19/20     NO HISTORY OF SURGERY         Social History:  Patient  reports that she has never smoked. She has never used smokeless tobacco. She reports that she drinks alcohol. She reports that she does not use drugs.    Family History:  Family History   Problem Relation Age of Onset     Cerebrovascular Disease Mother         2 strokes - at age 48     Lipids Mother      Hypertension Father      Lipids Maternal Grandmother      Hypertension Maternal Grandmother      Lipids Maternal Grandfather      Other Cancer Maternal Grandfather         lymphoma     Breast Cancer No family hx of        Medications:  Current Outpatient Medications   Medication Sig Dispense Refill     amphetamine-dextroamphetamine (ADDERALL) 10 MG tablet TAKE 1 TABLET BY MOUTH EVERY MORNING AND 1 TABLET IN THE AFTERNOON IF NEEDED  0     emollient (VANICREAM) external cream Apply topically daily 454 g 11     Levonorgestrel (SAIDA IU)        mometasone (ELOCON) 0.1 % external cream Apply topically daily To red scaly rash. Don't use on your back 50 g 11     montelukast (SINGULAIR) 10 MG tablet Take 1 tablet by mouth daily  5     pimecrolimus (ELIDEL) 1 % external cream Apply thin layer to affected areas twice a day. 30 g 1     UNABLE TO FIND Take 1 tablet by mouth daily MEDICATION NAME: [facility]          Allergies   Allergen Reactions     Gold      Confirmed with patch test.      No Clinical Screening - See Comments      Hair dye and the ingredients.          Review of Systems:  - As per HPI  - Constitutional: Otherwise feeling well today, in usual state of health.  - Skin: As above in HPI. No additional skin concerns.    Physical exam:  Vitals: There were no vitals taken for this visit.  GEN: This is a well developed, well-nourished female in no acute  distress, in a pleasant mood.    SKIN: Waist-up skin, which includes the head/face, neck, both arms, chest, back, abdomen, digits and/or nails was examined.  - Lorenzo Skin Type 1  - There are numerous pink scaly papules coalescing into plaques on the face, chest, neck, arms, axillae, and hands with some lichenification and evidence of excoriations.  - No other lesions of concern on areas examined.     Order for PATCH TESTS     [x] Outpatient  [] Inpatient: [facility]..../ Bed ....      Skin Atopy (atopic dermatitis) [] Yes   [x] No  Rhinitis/Sinusitis:   [] Yes   [x] No  Allergic Asthma:   [] Yes   [x] No  Food Allergy:   [] Yes   [x] No  Leg ulcers:   [] Yes   [x] No  Hand eczema:   [x] Yes   [] No   Leading hand:   [x] R   [] L       [] Ambidextrous                      Reason for tests (suspected allergy): persistent contact dermatitis  Known previous allergies: gold and PPD (by outside allergist); possibly MCI    Standardized panels  [x] Standard panel (40 tests)  [x] Preservatives & Antimicrobials (31 tests)  [x] Emulsifiers & Additives (25 tests)   [x] Perfumes/Flavours & Plants (25 tests)  [x] Hairdresser panel (12 tests)  [x] Rubber Chemicals (22 tests)  [x] Plastics (26 tests) hairspray  [] Colorants/Dyes/Food additives (20 tests)  [] Metals (implants/dental) (24 tests)   [] Local anaesthetics/NSAIDs (13 tests)  [] Antibiotics & Antimycotics (14 tests)   [] Corticosteroids (15 tests)   [] Photopatch test (62 tests)   [x] others: gold      [] Patient's own products: ...    DO NOT test if chemical or biological identity is unknown!     always ask from patient the product information and safety sheets (MSDS)     [] Patient needs consultation with Allergy team (changes of tests may apply)  [] Tests discussed with Allergy team (can have direct appointment for patch tests)    RESULTS & EVALUATION of PATCH TESTS    Patch test readings after     [x] 2 days, [] 3 days [x] 4 days, [] 5 days,   Other duration:  ...    Applied patch tests with results (import here the list of patch tests):  Order documented by: Andreea Guerrier CMA  Order reviewed by: Jo Allen LPN   Physician:    Date/time of application:09/16/2019  Localization of application: Back >>> no eczema of the back    STANDARD Series         No Substance 2 days 4 days remarks   1 Isidro Mix [C] - -    2 Colophony - -    3  2-Mercaptobenzothiazole  - -     4 Methylisothiazolinone (+) +/++    5 Carba Mix - -    6 Thiuram Mix [A] - -    7 Bisphenol A Epoxy Resin - -    8 V-Ytba-Dzdiehpyvxn-Formaldehyde Resin - -    9 Mercapto Mix [A] - -    10 Black Rubber Mix- PPD [B] - -    11 Potassium Dichromate  -  -    12 Balsam of [place] (Myroxylon Pereirae Resin) - -    13 Nickel Sulphate Hexahydrate - -    14 Mixed Dialkyl Thiourea - -    15 Paraben Mix [B] - -    16 Methyldibromo Glutaronitrile - -    17 Fragrance Mix - -    18 2-Bromo-2-Nitropropane-1,3-Diol (Bronopol) - -    19 Lyral - -    20 Tixocortol-21- Pivalate - -    21 Diazolidiyl Urea (Germall II) - -    22 Methyl Methacrylate - -    23 Cobalt (II) Chloride Hexahydrate - -    24 Fragrance Mix II  - -    25 Compositae Mix - -    26 Benzoyl Peroxide - -    27 Bacitracin - -    28 Formaldehyde - -    29 Methylchloroisothiazolinone / Methylisothiazolinone - +/++    30 Corticosteroid Mix - -    31 Sodium Lauryl Sulfate - -    32 Lanolin Alcohol - -    33 Turpentine - -    34 Cetylstearylalcohol - -    35 Chlorhexidine Dicluconate - -    36 Budenoside - -    37 Imidazolidinyl Urea  - -    38 Ethyl-2 Cyanoacrylate - -    39 Quaternium 15 (Dowicil 200) - -    40 Decyl Glucoside - -      EMULSIFIERS & ADDITIVES        No Substance 2 days 4 days remarks   41 Polyethylene Glycol-400 - -    42 Cocamidopropyl Betaine - -    43 Amerchol L101 - -    44 Propylene Glycol - -    45 Triethanolamine - -    46 Sorbitane Sesquiolate - -    47 Isopropylmyristate - -    48 Polysorbate 80  - -    49 Amidoamine    (Stearamidopropyl Dimethylamine) - -    50 Oleamidopropyl Dimethylamine - -    51 Lauryl Glucoside - -    52 Coconut Diethanolamide  - -    53 2-Hydroxy-4-Methoxy Benzophenone (Oxybenzone) - -    54 Benzophenone-4 (Sulisobenzon) + +/++    55 Propolis - -    56 Dexpanthenol - -    57 Carboxymethyl Cellulose Sodium - -    58 Abitol - -    59 Tert-Butylhydroquinone - -    60 Benzyl Salicylate - -     Antioxidant      61 Dodecyl Gallate - -    62 Butylhydroxyanisole (BHA) - -    63 Butylhydroxytoluene (BHT) - -    64 Di-Alpha-Tocopherol (Vit E) + -    65 Propyl Gallate - -      PERFUMES, FLAVORS & PLANTS         No Substance 2 days 4 days remarks   66 Benzyl Salicylate - -    67 Benzyl Cinnamate - -    68 Di-Limonene (Dipentene) - -    69 Cananga Odorata (Tennille Null) (I) - -    70 Lichen Acid Mix - -    71 Mentha Piperita Oil (Peppermint Oil) - -    72 Sesquiterpenelactone mix - -    73 Tea Tree Oil, Oxidized - -    74 Wood Tar Mix - -    75 Abietic Acid - -    76 Lavendula Angustifolia Oil (Lavender Oil) - -    77 Camphor  - -     Fragrance Mix I      78 Oakmoss Absolute - -    79 Eugenol - -    80 Geraniol - -    81 Hydroxycitronellal - -    82 Isoeugenol - -    83 Cinnamic Aldehyde (+) -    84 Cinnamic Alcohol  - -    85 Sorbitane Sesquioleate - -     Fragrance mix II      86 Citronellol - -    87 Alpha-Hexylcinnamic Aldehyde    - -    88 Citral - -    89 Farnesol - -    90 Coumarin - -      PRESERVATIVES & ANTIMICROBIALS         No Substance 2 days 4 days remarks   91  1,2-Benzisothiazoline-3-One, Sodium Salt - -    92  1,3,5-Patrick (2-Hydroxyethyl) - Hexahydrotriazine (Grotan BK) - -    93 8-Hpsqoxtnnghbb-4-Nitro-1, 3-Propanediol - -    94  3, 4, 4' - Triclocarban - -    95 4 - Chloro - 3 - Cresol - -    96 4 - Chloro - 4 - Xylenol (PCMX) - -    97 7-Ethylbicyclooxazolidine (Hugo [id number]) - -    98 Benzalkonium Chloride - -    99 Benzyl Alcohol - -    100 Cetalkonium Chloride - -    101 Cetylpyrimidine Chloride   - -    102 Chloroacetamide - -    103 DMDM Hydantoin - -    104 Glutaraldehyde - -    105 Triclosan - -    106 Glyoxal Trimeric Dihydrate - -    107 Iodopropynyl Butylcarbamate - -    108 Octylisothiazoline - -    109 Iodoform - -    110 (Nitrobutyl) Morpholine/(Ethylnitro-Trimethylene) Dimorpholine (Bioban P 1487) - -    111 Phenoxyethanol - -    112 Phenyl Salicylate - -    113 Povidone Iodine - -    114 Sodium Benzoate - -    115 Sodium Disulfite - -    116 Sorbic Acid - -    117 Thimerosal - -     Parabens      118 Butyl-P-Hydroxybenzoate - -    119 Ethyl-P-Hydroxybenzoate - -    120 Methyl-P-Hydroxybenzoate - -    121 Propyl-P-Hydroxybenzoate - -      HAIRDRESSER Series         No Substance 2 days 4 days remarks   122 P-Phenylenediamin  ++  ++/+++    123 P-Toluenediamine Sulphate  +  +/++    124 Ammonium Thioglycolate - -    125 Ammonium Persulfate +/++ ++/+++    126 Resorcinol - -    127 3-Aminophenol - -    128 P-Aminophenol - -    129 Glyceryl Monothioglycolate - -    130 Zinc Pyrithione  - -    131 Pyrogallol - -    132 P-Aminodiphenylamine - -    133 Dimethylaminopropylamin (DMAPA) - -      RUBBER CHEMICALS         No Substance 2 days 4 days remarks    Carbamate      134 Zinc Bis ( Diethyldithio carbamate ) (ZDEC) - -    135 Zinc Bis (Dibutyldithiocarbamate) - -    136 1,3-Diphenylguanidine (DPG) - -     Thiourea      137 Dibutylthiourea - -    138 Diphenyltiourea - -    139 Thiourea - -     Mercapto chemicals      140 Morpholinyl Mercaptobenzothiazole - -    141 E-Yzayjmsehp-3-Benzothiazyl-Sulfenamide - -    142 Dibenzothiazyl Disulfide - -     Thiuram chemicals      143 Dipentamethylenethiuram Disulfide - -    144 Tetraethylthiuram Disulfide (Disulfiram) - -    145 Tetramethylthiuram Disulfide - -    146 Tetramethyl Thiuram Monosulfide (TMTM) - -     4-Phenylenediamine derivatives      147 N-Isopropyl-N'-Phenyl-P-Phenylenediamine (IPPD) - -    148 Ulnfrmhy-Y-Xgaclcxfdptmdfad (DPPD) - -     Various Rubber  Additives      149 Hydroquinone Monobenzylether  - -    150 Hexamethylenetetramine - -    151 4,4'-Dihydroxybiphenyl - -    152 Cyclohexylthiophtalimide - -    153 Ethylenediamine Dihydrochloride - -    154 N-Phenyl-B-Naphthylamine - -    155 Dodecyl Mercaptan - -        PLASTICS         No Substance 2 days 4 days remarks    Acrylates - -    156 2-Hydroxyethyl Methacrylate (HEMA) - -    157 1,4-Butandioldimethacrylate (BUDMA) - -    158  2-Ethylhexyl Acrylate - -    159 Bisphenol-A-Dimethacrylate  - -    160 Diurethane-Dimethacrylate - -    161 Ethyleneglycoldimethacrylate (EGDMA) - -    162 Pentaerythritoltriacrylate (KACIE) - -    163 Triethylene Glycol Dimethacrylate (TEGDMA) - -     Synthetic material/additives       164 E-Lrai-Ixqsxxsuxdr - -    165 Tricresyl Phosphate - -    166 8-Szdu-Udburwtbxoasm - -    167 Bis (2-Ethylhexyl) Phthalate - -    168 Dibutylphthalate - -    169 Dimethylphthalate - -    170 Toluene-2,4-Diisocyanate - -    171 Diphenylmethane-4,4''-Diisocyanate - -     EPOXY RESIN SYSTEMS       Reactive Solvents - -    172 Cresyl Glycidyl Ether - -    173 Butyl Glycidyl Ether - -    174 Phenyl Glycidyl Ether - -    175 1,4-Butanediol Diglycidyl Ether - -    176 1,6-Hexanediole Diglycidyl Ether - -     Hardener / Accelerator - -    177 Ethylenediamine Dihydrochloride - -    178 Triethylenetetramine - -    179 Diethylenetriamine - -    180 Isophorone Diamine (IPD) - -    181 N,N-Dimethyl-P-Toluidine - -      METALS (Implants / Dental)         No Substance 2 days 4 days remarks   182 Gold Sodium Thiosulfate + ++ 10     Results of patch tests:                         Interpretation:    - Negative                    A    = Allergic      (+) Erythema    TI   = Toxic/irritant   + E + Infiltration    RaP = Relevance at Present     ++ E/I + Papulovesicle   Rpr  = Relevance Previously     +++ E/I/P + Blister     nR   = No Relevance    [x] Allergic reaction diagnosed against: hair dyes: ++/+++  P-Phenylenediamin, +/++ P-Toluenediamine Sulphate, ++/+++Ammonium Persulfate  Metals: ++ Gold Sodium Thiosulfate  Additives: +/++ Methylisothiazolinone and +/++ MCI/MI (crossreacting) and +/++ Benzophenone-4 (Sulisobenzon)    Interpretation/ remarks:   We could repeat positive contact sensitization to MI/MCI, gold, and PPD, and could identify some additional contact sensitizations to ammonium persulfate and toluenediamine sulphate, and benzophenone. This means that the pt should avoid all products containing the preservative MI/MCI, and sunscreens containing benzophenone-4. In addition, the pt has a clear occupational contact dermatitis to hair dyes and hair bleaching products.     [x] Patient information given   [x] ACDS information ([employer] HEDY # [id number], and [id number] ) for Methylisothiazolinone, Methylchloroisothiazolinone / Methylisothiazolinone, Benzophenone-4 (Sulisobenzon), P-Phenylenediamin, Ammonium Persulfate, Gold Sodium Thiosulfate   [] [employer] information       ==> final Diagnosis:    >>>Proven and clinical relevant allergic contact dermatitis to preservatives/additives and hair dyes that have a clear occupational component  Contact sensitization to:   --preservative methylisothiazolinone, methylchloroisothiazolinone/methylisothiazolinone  --to benzophenone-4 in sunscreens  --hair colorings and bleach such as p-phenylenediamin and ammonium persulfate  --to metal gold      ==> Treatment prescribed/Plan:  >> follow the recommendation of the [employer] hedy for any product that might get in contact with the skin    >> discussed with patient about future of occupation as  in this clearly occupational disease (occupational allergic contact dermatitis)      Prescribed mometasone cream    Recommended vanicream and Free&Clear shampoo, conditioner, soaps, and detergent    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 She is not using any treatment for the hands at the moment.      The pt  will call us if there are any changes or increased reactions. The patient will schedule a follow up appointment prn.    These conclusions are made at the best of ones knowledge and belief  based on the provided evidence such as patients history and allergy test results and they can change over time or can be incomplete because of missing informations.    CC patient's PCP  [facility]  [address]  [place] [address]      CC patient's Dentist  [facility]  [address] [place] [address]      Staff Involved:    Scribe Disclosure  I, Ayleen Arguelles, am serving as a scribe to document services personally performed by Dr. Benigno Ruffin, based on data collection and the provider's statements to me.     >>> Some of the time at today's appointment was spent interpreting and discussing with the pt her allergy testing results.    I spent a total of 45 min face to face with the patient during today's office visit. About 50% of the time was spent counseling the patient and/or coordinating care regarding their allergy.          Again, thank you for allowing me to participate in the care of your patient.        Sincerely,        Benigno Ruffin MD

## 2019-09-20 NOTE — NURSING NOTE
Allergy Rooming Note    Lynne Cortez's goals for this visit include:   Chief Complaint   Patient presents with     Allergy Testing Followup     Lynne is here for patch testing day 5     Jo Allen LPN

## 2019-09-20 NOTE — PATIENT INSTRUCTIONS
RESULTS & EVALUATION of PATCH TESTS    Patch test readings after     [x] 2 days, [] 3 days [x] 4 days, [] 5 days,   Other duration: ...    Applied patch tests with results (import here the list of patch tests):  Order documented by: Andreea Guerrier CMA  Order reviewed by: Jo Allen LPN   Physician:    Date/time of application:09/16/2019  Localization of application: Back >>> no eczema of the back    STANDARD Series         No Substance 2 days 4 days remarks   1 Isidro Mix [C] - -    2 Colophony - -    3  2-Mercaptobenzothiazole  - -     4 Methylisothiazolinone (+) +/++    5 Carba Mix - -    6 Thiuram Mix [A] - -    7 Bisphenol A Epoxy Resin - -    8 E-Syjs-Tcpswmunmkq-Formaldehyde Resin - -    9 Mercapto Mix [A] - -    10 Black Rubber Mix- PPD [B] - -    11 Potassium Dichromate  -  -    12 Balsam of [place] (Myroxylon Pereirae Resin) - -    13 Nickel Sulphate Hexahydrate - -    14 Mixed Dialkyl Thiourea - -    15 Paraben Mix [B] - -    16 Methyldibromo Glutaronitrile - -    17 Fragrance Mix - -    18 2-Bromo-2-Nitropropane-1,3-Diol (Bronopol) - -    19 Lyral - -    20 Tixocortol-21- Pivalate - -    21 Diazolidiyl Urea (Germall II) - -    22 Methyl Methacrylate - -    23 Cobalt (II) Chloride Hexahydrate - -    24 Fragrance Mix II  - -    25 Compositae Mix - -    26 Benzoyl Peroxide - -    27 Bacitracin - -    28 Formaldehyde - -    29 Methylchloroisothiazolinone / Methylisothiazolinone - +/++    30 Corticosteroid Mix - -    31 Sodium Lauryl Sulfate - -    32 Lanolin Alcohol - -    33 Turpentine - -    34 Cetylstearylalcohol - -    35 Chlorhexidine Dicluconate - -    36 Budenoside - -    37 Imidazolidinyl Urea  - -    38 Ethyl-2 Cyanoacrylate - -    39 Quaternium 15 (Dowicil 200) - -    40 Decyl Glucoside - -      EMULSIFIERS & ADDITIVES        No Substance 2 days 4 days remarks   41 Polyethylene Glycol-400 - -    42 Cocamidopropyl Betaine - -    43 Amerchol L101 - -    44 Propylene Glycol - -    45  Triethanolamine - -    46 Sorbitane Sesquiolate - -    47 Isopropylmyristate - -    48 Polysorbate 80  - -    49 Amidoamine   (Stearamidopropyl Dimethylamine) - -    50 Oleamidopropyl Dimethylamine - -    51 Lauryl Glucoside - -    52 Coconut Diethanolamide  - -    53 2-Hydroxy-4-Methoxy Benzophenone (Oxybenzone) - -    54 Benzophenone-4 (Sulisobenzon) + +/++    55 Propolis - -    56 Dexpanthenol - -    57 Carboxymethyl Cellulose Sodium - -    58 Abitol - -    59 Tert-Butylhydroquinone - -    60 Benzyl Salicylate - -     Antioxidant      61 Dodecyl Gallate - -    62 Butylhydroxyanisole (BHA) - -    63 Butylhydroxytoluene (BHT) - -    64 Di-Alpha-Tocopherol (Vit E) + -    65 Propyl Gallate - -      PERFUMES, FLAVORS & PLANTS         No Substance 2 days 4 days remarks   66 Benzyl Salicylate - -    67 Benzyl Cinnamate - -    68 Di-Limonene (Dipentene) - -    69 Cananga Odorata (Tennille Null) (I) - -    70 Lichen Acid Mix - -    71 Mentha Piperita Oil (Peppermint Oil) - -    72 Sesquiterpenelactone mix - -    73 Tea Tree Oil, Oxidized - -    74 Wood Tar Mix - -    75 Abietic Acid - -    76 Lavendula Angustifolia Oil (Lavender Oil) - -    77 Camphor  - -     Fragrance Mix I      78 Oakmoss Absolute - -    79 Eugenol - -    80 Geraniol - -    81 Hydroxycitronellal - -    82 Isoeugenol - -    83 Cinnamic Aldehyde (+) -    84 Cinnamic Alcohol  - -    85 Sorbitane Sesquioleate - -     Fragrance mix II      86 Citronellol - -    87 Alpha-Hexylcinnamic Aldehyde    - -    88 Citral - -    89 Farnesol - -    90 Coumarin - -      PRESERVATIVES & ANTIMICROBIALS         No Substance 2 days 4 days remarks   91  1,2-Benzisothiazoline-3-One, Sodium Salt - -    92  1,3,5-Patrick (2-Hydroxyethyl) - Hexahydrotriazine (Grotan BK) - -    93 9-Vxxfmhtrbpini-5-Nitro-1, 3-Propanediol - -    94  3, 4, 4' - Triclocarban - -    95 4 - Chloro - 3 - Cresol - -    96 4 - Chloro - 4 - Xylenol (PCMX) - -    97 7-Ethylbicyclooxazolidine ([facility] [id number]) -  -    98 Benzalkonium Chloride - -    99 Benzyl Alcohol - -    100 Cetalkonium Chloride - -    101 Cetylpyrimidine Chloride  - -    102 Chloroacetamide - -    103 DMDM Hydantoin - -    104 Glutaraldehyde - -    105 Triclosan - -    106 Glyoxal Trimeric Dihydrate - -    107 Iodopropynyl Butylcarbamate - -    108 Octylisothiazoline - -    109 Iodoform - -    110 (Nitrobutyl) Morpholine/(Ethylnitro-Trimethylene) Dimorpholine (Bioban P 1487) - -    111 Phenoxyethanol - -    112 Phenyl Salicylate - -    113 Povidone Iodine - -    114 Sodium Benzoate - -    115 Sodium Disulfite - -    116 Sorbic Acid - -    117 Thimerosal - -     Parabens      118 Butyl-P-Hydroxybenzoate - -    119 Ethyl-P-Hydroxybenzoate - -    120 Methyl-P-Hydroxybenzoate - -    121 Propyl-P-Hydroxybenzoate - -      HAIRDRESSER Series         No Substance 2 days 4 days remarks   122 P-Phenylenediamin  ++  ++/+++    123 P-Toluenediamine Sulphate  +  +/++    124 Ammonium Thioglycolate - -    125 Ammonium Persulfate +/++ ++/+++    126 Resorcinol - -    127 3-Aminophenol - -    128 P-Aminophenol - -    129 Glyceryl Monothioglycolate - -    130 Zinc Pyrithione  - -    131 Pyrogallol - -    132 P-Aminodiphenylamine - -    133 Dimethylaminopropylamin (DMAPA) - -      RUBBER CHEMICALS         No Substance 2 days 4 days remarks    Carbamate      134 Zinc Bis ( Diethyldithio carbamate ) (ZDEC) - -    135 Zinc Bis (Dibutyldithiocarbamate) - -    136 1,3-Diphenylguanidine (DPG) - -     Thiourea      137 Dibutylthiourea - -    138 Diphenyltiourea - -    139 Thiourea - -     Mercapto chemicals      140 Morpholinyl Mercaptobenzothiazole - -    141 W-Sqrxqbhjhi-8-Benzothiazyl-Sulfenamide - -    142 Dibenzothiazyl Disulfide - -     Thiuram chemicals      143 Dipentamethylenethiuram Disulfide - -    144 Tetraethylthiuram Disulfide (Disulfiram) - -    145 Tetramethylthiuram Disulfide - -    146 Tetramethyl Thiuram Monosulfide (TMTM) - -     4-Phenylenediamine derivatives       147 N-Isopropyl-N'-Phenyl-P-Phenylenediamine (IPPD) - -    148 Dmflbvgl-L-Pcnzhvwuuacernhp (DPPD) - -     Various Rubber Additives      149 Hydroquinone Monobenzylether  - -    150 Hexamethylenetetramine - -    151 4,4'-Dihydroxybiphenyl - -    152 Cyclohexylthiophtalimide - -    153 Ethylenediamine Dihydrochloride - -    154 N-Phenyl-B-Naphthylamine - -    155 Dodecyl Mercaptan - -        PLASTICS         No Substance 2 days 4 days remarks    Acrylates - -    156 2-Hydroxyethyl Methacrylate (HEMA) - -    157 1,4-Butandioldimethacrylate (BUDMA) - -    158  2-Ethylhexyl Acrylate - -    159 Bisphenol-A-Dimethacrylate  - -    160 Diurethane-Dimethacrylate - -    161 Ethyleneglycoldimethacrylate (EGDMA) - -    162 Pentaerythritoltriacrylate (KACIE) - -    163 Triethylene Glycol Dimethacrylate (TEGDMA) - -     Synthetic material/additives       164 L-Txhm-Oceyfyndeik - -    165 Tricresyl Phosphate - -    166 6-Eibn-Sdxrfhushwmue - -    167 Bis (2-Ethylhexyl) Phthalate - -    168 Dibutylphthalate - -    169 Dimethylphthalate - -    170 Toluene-2,4-Diisocyanate - -    171 Diphenylmethane-4,4''-Diisocyanate - -     EPOXY RESIN SYSTEMS       Reactive Solvents - -    172 Cresyl Glycidyl Ether - -    173 Butyl Glycidyl Ether - -    174 Phenyl Glycidyl Ether - -    175 1,4-Butanediol Diglycidyl Ether - -    176 1,6-Hexanediole Diglycidyl Ether - -     Hardener / Accelerator - -    177 Ethylenediamine Dihydrochloride - -    178 Triethylenetetramine - -    179 Diethylenetriamine - -    180 Isophorone Diamine (IPD) - -    181 N,N-Dimethyl-P-Toluidine - -      METALS (Implants / Dental)         No Substance 2 days 4 days remarks   182 Gold Sodium Thiosulfate + ++ 10     Results of patch tests:                         Interpretation:    - Negative                    A    = Allergic      (+) Erythema    TI   = Toxic/irritant   + E + Infiltration    RaP = Relevance at Present     ++ E/I + Papulovesicle   Rpr  = Relevance  Previously     +++ E/I/P + Blister     nR   = No Relevance    [x] Allergic reaction diagnosed against: hair dyes: ++/+++ P-Phenylenediamin, +/++ P-Toluenediamine Sulphate, ++/+++Ammonium Persulfate  Metals: ++ Gold Sodium Thiosulfate  Additives: +/++ Methylisothiazolinone and +/++ MCI/MI (crossreacting) and +/++ Benzophenone-4 (Sulisobenzon)    Interpretation/ remarks:   We could repeat positive contact sensitization to MI/MCI, gold, and PPD, and could identify some additional contact sensitizations to ammonium persulfate and toluenediamine sulphate, and benzophenone. This means that the pt should avoid all products containing the preservative MI/MCI, and sunscreens containing benzophenone-4. In addition, the pt has a clear occupational contact dermatitis to hair dyes and hair bleaching products.     [x] Patient information given   [x] ACDS information ([employer] HEDY # [id number], and [id number] ) for Methylisothiazolinone, Methylchloroisothiazolinone / Methylisothiazolinone, Benzophenone-4 (Sulisobenzon), P-Phenylenediamin, Ammonium Persulfate, Gold Sodium Thiosulfate   [] [employer] information         ==> final Diagnosis:    >>>Proven and clinical relevant allergic contact dermatitis to preservatives/additives and hair dyes that have a clear occupational component  Contact sensitization to:   --preservative methylisothiazolinone, methylchloroisothiazolinone / methylisothiazolinone  --to benzophenone-4 in sunscreens  --hair colorings and bleach such as p-phenylenediamin and ammonium persulfate  --to metal gold      ==> Treatment prescribed/Plan:  >> follow the recommendation of the [employer] hedy for any product that might get in contact with the skin    >> discussed with patient about future of occupation as  in this clearly occupational disease (occupational allergic contact dermatitis)      Prescribed mometasone cream    Recommended vanicream and Free&Clear shampoo, conditioner, soaps, and detergent    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 She is not using any treatment for the hands at the moment.    The pt will call us if there are any changes or increased reactions. The pt will schedule a follow up appointment prn.    These conclusions are made at the best of ones knowledge and belief  based on the provided evidence such as patients history and allergy test results and they can change over time or can be incomplete because of missing informations.    CC patient's PCP  [facility]  [address]  [place] [address]      CC patient's Dentist  [facility]  [address] [place] [address]

## 2019-09-20 NOTE — PROGRESS NOTES
[facility] Allergy Note    Allergy Clinic  [facility]  [address] [place] [id number]    Allergy Problem List:  1. Contact dermatitis  - Unclear triggers but suspected aerosolized given distribution. 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 Has been previously tested by [facility]. Told that she has a contact dermatitis to Gold and PPD (possibly MCI).   - Current Tx: mometasone cream and tacrolimus  -patch testing 9/16/19    Encounter Date: Sep 20, 2019    CC:   Consult for allergic contact dermatitis    History of Present Illness:  Ms. Lynne Cortez is a 24 year old female with past dermatologic history listed above who presents in self referral for further evaluation of suspected contact dermatitis. Presents with her mom to clinic. Endorses that 6 years ago, she began a new job as hairdresser. About a year afterwards, began noting a hand rash. Described as red, intensely itchy, and scaly. Continued work but had to change her job to avoid suspected triggers (hair coloring products). Over the last 6 months, rash has progressed to involve arms, chest, face, neck, upper back, axillae, and medial thighs. Has been using triamcinolone, elidel, and tacrolimus intermittently.     Of note, went to outside patch testing and was told she has a contact dermatitis to gold and PPD (possibly also MCI).      Past Medical History:   Patient Active Problem List   Diagnosis     ADD (attention deficit disorder) without hyperactivity     IUD (intrauterine device) in place: Saida     Past Medical History:   Diagnosis Date     NO ACTIVE PROBLEMS      Past Surgical History:   Procedure Laterality Date     C LEVONORGESTREL-RELEASE INTRAUTERINE CONTRACEPTIVE (SAIDA), 13.5 MG  07/19/2017    remove by 7/19/20     NO HISTORY OF SURGERY         Social History:  Patient  reports  that she has never smoked. She has never used smokeless tobacco. She reports that she drinks alcohol. She reports that she does not use drugs.    Family History:  Family History   Problem Relation Age of Onset     Cerebrovascular Disease Mother         2 strokes - at age 48     Lipids Mother      Hypertension Father      Lipids Maternal Grandmother      Hypertension Maternal Grandmother      Lipids Maternal Grandfather      Other Cancer Maternal Grandfather         lymphoma     Breast Cancer No family hx of        Medications:  Current Outpatient Medications   Medication Sig Dispense Refill     amphetamine-dextroamphetamine (ADDERALL) 10 MG tablet TAKE 1 TABLET BY MOUTH EVERY MORNING AND 1 TABLET IN THE AFTERNOON IF NEEDED  0     emollient (VANICREAM) external cream Apply topically daily 454 g 11     Levonorgestrel (SAIDA IU)        mometasone (ELOCON) 0.1 % external cream Apply topically daily To red scaly rash. Don't use on your back 50 g 11     montelukast (SINGULAIR) 10 MG tablet Take 1 tablet by mouth daily  5     pimecrolimus (ELIDEL) 1 % external cream Apply thin layer to affected areas twice a day. 30 g 1     UNABLE TO FIND Take 1 tablet by mouth daily MEDICATION NAME: [facility]          Allergies   Allergen Reactions     Gold      Confirmed with patch test.      No Clinical Screening - See Comments      Hair dye and the ingredients.          Review of Systems:  - As per HPI  - Constitutional: Otherwise feeling well today, in usual state of health.  - Skin: As above in HPI. No additional skin concerns.    Physical exam:  Vitals: There were no vitals taken for this visit.  GEN: This is a well developed, well-nourished female in no acute distress, in a pleasant mood.    SKIN: Waist-up skin, which includes the head/face, neck, both arms, chest, back, abdomen, digits and/or nails was examined.  - Lorenzo Skin Type 1  - There are numerous pink scaly papules coalescing into plaques on the face, chest,  neck, arms, axillae, and hands with some lichenification and evidence of excoriations.  - No other lesions of concern on areas examined.     Order for PATCH TESTS     [x] Outpatient  [] Inpatient: [facility]..../ Bed ....      Skin Atopy (atopic dermatitis) [] Yes   [x] No  Rhinitis/Sinusitis:   [] Yes   [x] No  Allergic Asthma:   [] Yes   [x] No  Food Allergy:   [] Yes   [x] No  Leg ulcers:   [] Yes   [x] No  Hand eczema:   [x] Yes   [] No   Leading hand:   [x] R   [] L       [] Ambidextrous                      Reason for tests (suspected allergy): persistent contact dermatitis  Known previous allergies: gold and PPD (by outside allergist); possibly MCI    Standardized panels  [x] Standard panel (40 tests)  [x] Preservatives & Antimicrobials (31 tests)  [x] Emulsifiers & Additives (25 tests)   [x] Perfumes/Flavours & Plants (25 tests)  [x] Hairdresser panel (12 tests)  [x] Rubber Chemicals (22 tests)  [x] Plastics (26 tests) hairspray  [] Colorants/Dyes/Food additives (20 tests)  [] Metals (implants/dental) (24 tests)   [] Local anaesthetics/NSAIDs (13 tests)  [] Antibiotics & Antimycotics (14 tests)   [] Corticosteroids (15 tests)   [] Photopatch test (62 tests)   [x] others: gold      [] Patient's own products: ...    DO NOT test if chemical or biological identity is unknown!     always ask from patient the product information and safety sheets (MSDS)     [] Patient needs consultation with Allergy team (changes of tests may apply)  [] Tests discussed with Allergy team (can have direct appointment for patch tests)    RESULTS & EVALUATION of PATCH TESTS    Patch test readings after     [x] 2 days, [] 3 days [x] 4 days, [] 5 days,   Other duration: ...    Applied patch tests with results (import here the list of patch tests):  Order documented by: Andreea Guerrier CMA  Order reviewed by: Jo Allen LPN   Physician:    Date/time of application:09/16/2019  Localization of application: Back >>> no eczema of  the back    STANDARD Series         No Substance 2 days 4 days remarks   1 Isidro Mix [C] - -    2 Colophony - -    3  2-Mercaptobenzothiazole  - -     4 Methylisothiazolinone (+) +/++    5 Carba Mix - -    6 Thiuram Mix [A] - -    7 Bisphenol A Epoxy Resin - -    8 Y-Iicv-Mrspsjyzdpo-Formaldehyde Resin - -    9 Mercapto Mix [A] - -    10 Black Rubber Mix- PPD [B] - -    11 Potassium Dichromate  -  -    12 Balsam of [place] (Myroxylon Pereirae Resin) - -    13 Nickel Sulphate Hexahydrate - -    14 Mixed Dialkyl Thiourea - -    15 Paraben Mix [B] - -    16 Methyldibromo Glutaronitrile - -    17 Fragrance Mix - -    18 2-Bromo-2-Nitropropane-1,3-Diol (Bronopol) - -    19 Lyral - -    20 Tixocortol-21- Pivalate - -    21 Diazolidiyl Urea (Germall II) - -    22 Methyl Methacrylate - -    23 Cobalt (II) Chloride Hexahydrate - -    24 Fragrance Mix II  - -    25 Compositae Mix - -    26 Benzoyl Peroxide - -    27 Bacitracin - -    28 Formaldehyde - -    29 Methylchloroisothiazolinone / Methylisothiazolinone - +/++    30 Corticosteroid Mix - -    31 Sodium Lauryl Sulfate - -    32 Lanolin Alcohol - -    33 Turpentine - -    34 Cetylstearylalcohol - -    35 Chlorhexidine Dicluconate - -    36 Budenoside - -    37 Imidazolidinyl Urea  - -    38 Ethyl-2 Cyanoacrylate - -    39 Quaternium 15 (Dowicil 200) - -    40 Decyl Glucoside - -      EMULSIFIERS & ADDITIVES        No Substance 2 days 4 days remarks   41 Polyethylene Glycol-400 - -    42 Cocamidopropyl Betaine - -    43 Amerchol L101 - -    44 Propylene Glycol - -    45 Triethanolamine - -    46 Sorbitane Sesquiolate - -    47 Isopropylmyristate - -    48 Polysorbate 80  - -    49 Amidoamine   (Stearamidopropyl Dimethylamine) - -    50 Oleamidopropyl Dimethylamine - -    51 Lauryl Glucoside - -    52 Coconut Diethanolamide  - -    53 2-Hydroxy-4-Methoxy Benzophenone (Oxybenzone) - -    54 Benzophenone-4 (Sulisobenzon) + +/++    55 Propolis - -    56 Dexpanthenol - -    57  Carboxymethyl Cellulose Sodium - -    58 Abitol - -    59 Tert-Butylhydroquinone - -    60 Benzyl Salicylate - -     Antioxidant      61 Dodecyl Gallate - -    62 Butylhydroxyanisole (BHA) - -    63 Butylhydroxytoluene (BHT) - -    64 Di-Alpha-Tocopherol (Vit E) + -    65 Propyl Gallate - -      PERFUMES, FLAVORS & PLANTS         No Substance 2 days 4 days remarks   66 Benzyl Salicylate - -    67 Benzyl Cinnamate - -    68 Di-Limonene (Dipentene) - -    69 Cananga Odorata (Tennille Null) (I) - -    70 Lichen Acid Mix - -    71 Mentha Piperita Oil (Peppermint Oil) - -    72 Sesquiterpenelactone mix - -    73 Tea Tree Oil, Oxidized - -    74 Wood Tar Mix - -    75 Abietic Acid - -    76 Lavendula Angustifolia Oil (Lavender Oil) - -    77 Camphor  - -     Fragrance Mix I      78 Oakmoss Absolute - -    79 Eugenol - -    80 Geraniol - -    81 Hydroxycitronellal - -    82 Isoeugenol - -    83 Cinnamic Aldehyde (+) -    84 Cinnamic Alcohol  - -    85 Sorbitane Sesquioleate - -     Fragrance mix II      86 Citronellol - -    87 Alpha-Hexylcinnamic Aldehyde    - -    88 Citral - -    89 Farnesol - -    90 Coumarin - -      PRESERVATIVES & ANTIMICROBIALS         No Substance 2 days 4 days remarks   91  1,2-Benzisothiazoline-3-One, Sodium Salt - -    92  1,3,5-Patrick (2-Hydroxyethyl) - Hexahydrotriazine (Grotan BK) - -    93 5-Gehzqplyyaktw-7-Nitro-1, 3-Propanediol - -    94  3, 4, 4' - Triclocarban - -    95 4 - Chloro - 3 - Cresol - -    96 4 - Chloro - 4 - Xylenol (PCMX) - -    97 7-Ethylbicyclooxazolidine (Bioban [id number]) - -    98 Benzalkonium Chloride - -    99 Benzyl Alcohol - -    100 Cetalkonium Chloride - -    101 Cetylpyrimidine Chloride  - -    102 Chloroacetamide - -    103 DMDM Hydantoin - -    104 Glutaraldehyde - -    105 Triclosan - -    106 Glyoxal Trimeric Dihydrate - -    107 Iodopropynyl Butylcarbamate - -    108 Octylisothiazoline - -    109 Iodoform - -    110 (Nitrobutyl)  Morpholine/(Ethylnitro-Trimethylene) Dimorpholine (Bioban P 1487) - -    111 Phenoxyethanol - -    112 Phenyl Salicylate - -    113 Povidone Iodine - -    114 Sodium Benzoate - -    115 Sodium Disulfite - -    116 Sorbic Acid - -    117 Thimerosal - -     Parabens      118 Butyl-P-Hydroxybenzoate - -    119 Ethyl-P-Hydroxybenzoate - -    120 Methyl-P-Hydroxybenzoate - -    121 Propyl-P-Hydroxybenzoate - -      HAIRDRESSER Series         No Substance 2 days 4 days remarks   122 P-Phenylenediamin  ++  ++/+++    123 P-Toluenediamine Sulphate  +  +/++    124 Ammonium Thioglycolate - -    125 Ammonium Persulfate +/++ ++/+++    126 Resorcinol - -    127 3-Aminophenol - -    128 P-Aminophenol - -    129 Glyceryl Monothioglycolate - -    130 Zinc Pyrithione  - -    131 Pyrogallol - -    132 P-Aminodiphenylamine - -    133 Dimethylaminopropylamin (DMAPA) - -      RUBBER CHEMICALS         No Substance 2 days 4 days remarks    Carbamate      134 Zinc Bis ( Diethyldithio carbamate ) (ZDEC) - -    135 Zinc Bis (Dibutyldithiocarbamate) - -    136 1,3-Diphenylguanidine (DPG) - -     Thiourea      137 Dibutylthiourea - -    138 Diphenyltiourea - -    139 Thiourea - -     Mercapto chemicals      140 Morpholinyl Mercaptobenzothiazole - -    141 M-Nnyinkdsbe-1-Benzothiazyl-Sulfenamide - -    142 Dibenzothiazyl Disulfide - -     Thiuram chemicals      143 Dipentamethylenethiuram Disulfide - -    144 Tetraethylthiuram Disulfide (Disulfiram) - -    145 Tetramethylthiuram Disulfide - -    146 Tetramethyl Thiuram Monosulfide (TMTM) - -     4-Phenylenediamine derivatives      147 N-Isopropyl-N'-Phenyl-P-Phenylenediamine (IPPD) - -    148 Bhiniqdc-Z-Jfuttemzqgnngggf (DPPD) - -     Various Rubber Additives      149 Hydroquinone Monobenzylether  - -    150 Hexamethylenetetramine - -    151 4,4'-Dihydroxybiphenyl - -    152 Cyclohexylthiophtalimide - -    153 Ethylenediamine Dihydrochloride - -    154 N-Phenyl-B-Naphthylamine - -    155 Dodecyl  Mercaptan - -        PLASTICS         No Substance 2 days 4 days remarks    Acrylates - -    156 2-Hydroxyethyl Methacrylate (HEMA) - -    157 1,4-Butandioldimethacrylate (BUDMA) - -    158  2-Ethylhexyl Acrylate - -    159 Bisphenol-A-Dimethacrylate  - -    160 Diurethane-Dimethacrylate - -    161 Ethyleneglycoldimethacrylate (EGDMA) - -    162 Pentaerythritoltriacrylate (KACIE) - -    163 Triethylene Glycol Dimethacrylate (TEGDMA) - -     Synthetic material/additives       164 D-Jwzq-Ayxsysgfzlw - -    165 Tricresyl Phosphate - -    166 3-Sakn-Bindrapsevash - -    167 Bis (2-Ethylhexyl) Phthalate - -    168 Dibutylphthalate - -    169 Dimethylphthalate - -    170 Toluene-2,4-Diisocyanate - -    171 Diphenylmethane-4,4''-Diisocyanate - -     EPOXY RESIN SYSTEMS       Reactive Solvents - -    172 Cresyl Glycidyl Ether - -    173 Butyl Glycidyl Ether - -    174 Phenyl Glycidyl Ether - -    175 1,4-Butanediol Diglycidyl Ether - -    176 1,6-Hexanediole Diglycidyl Ether - -     Hardener / Accelerator - -    177 Ethylenediamine Dihydrochloride - -    178 Triethylenetetramine - -    179 Diethylenetriamine - -    180 Isophorone Diamine (IPD) - -    181 N,N-Dimethyl-P-Toluidine - -      METALS (Implants / Dental)         No Substance 2 days 4 days remarks   182 Gold Sodium Thiosulfate + ++ 10     Results of patch tests:                         Interpretation:    - Negative                    A    = Allergic      (+) Erythema    TI   = Toxic/irritant   + E + Infiltration    RaP = Relevance at Present     ++ E/I + Papulovesicle   Rpr  = Relevance Previously     +++ E/I/P + Blister     nR   = No Relevance    [x] Allergic reaction diagnosed against: hair dyes: ++/+++ P-Phenylenediamin, +/++ P-Toluenediamine Sulphate, ++/+++Ammonium Persulfate  Metals: ++ Gold Sodium Thiosulfate  Additives: +/++ Methylisothiazolinone and +/++ MCI/MI (crossreacting) and +/++ Benzophenone-4 (Sulisobenzon)    Interpretation/ remarks:   We could  repeat positive contact sensitization to MI/MCI, gold, and PPD, and could identify some additional contact sensitizations to ammonium persulfate and toluenediamine sulphate, and benzophenone. This means that the pt should avoid all products containing the preservative MI/MCI, and sunscreens containing benzophenone-4. In addition, the pt has a clear occupational contact dermatitis to hair dyes and hair bleaching products.     [x] Patient information given   [x] ACDS information ([employer] HEDY # [id number], and [id number] ) for Methylisothiazolinone, Methylchloroisothiazolinone / Methylisothiazolinone, Benzophenone-4 (Sulisobenzon), P-Phenylenediamin, Ammonium Persulfate, Gold Sodium Thiosulfate   [] [employer] information       ==> final Diagnosis:    >>>Proven and clinical relevant allergic contact dermatitis to preservatives/additives and hair dyes that have a clear occupational component  Contact sensitization to:   --preservative methylisothiazolinone, methylchloroisothiazolinone/methylisothiazolinone  --to benzophenone-4 in sunscreens  --hair colorings and bleach such as p-phenylenediamin and ammonium persulfate  --to metal gold      ==> Treatment prescribed/Plan:  >> follow the recommendation of the [employer] hedy for any product that might get in contact with the skin    >> discussed with patient about future of occupation as  in this clearly occupational disease (occupational allergic contact dermatitis)      Prescribed mometasone cream    Recommended vanicream and Free&Clear shampoo, conditioner, soaps, and detergent    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 She is not using any treatment for the hands at the moment.      The pt will call us if there are any changes or increased reactions. The patient will schedule a follow up appointment prn.    These conclusions are made at the best of ones knowledge and belief  based on the provided evidence such as patients history and allergy test  results and they can change over time or can be incomplete because of missing informations.    CC patient's PCP  [facility]  [address]  [place] [address]      CC patient's Dentist  [facility]  [address] [address]      Staff Involved:    Scribe Disclosure  I, Ayleen Arguelles, am serving as a scribe to document services personally performed by Dr. Benigno Ruffin, based on data collection and the provider's statements to me.     >>> Some of the time at today's appointment was spent interpreting and discussing with the pt her allergy testing results.    I spent a total of 45 min face to face with the patient during today's office visit. About 50% of the time was spent counseling the patient and/or coordinating care regarding their allergy.

## 2019-09-23 ENCOUNTER — PRE VISIT (OUTPATIENT)
Dept: ALLERGY | Facility: CLINIC | Age: 24
End: 2019-09-23

## 2019-10-02 ENCOUNTER — HEALTH MAINTENANCE LETTER (OUTPATIENT)
Age: 24
End: 2019-10-02

## 2020-01-14 ENCOUNTER — OFFICE VISIT (OUTPATIENT)
Dept: PEDIATRICS | Facility: CLINIC | Age: 25
End: 2020-01-14
Payer: COMMERCIAL

## 2020-01-14 ENCOUNTER — ANCILLARY PROCEDURE (OUTPATIENT)
Dept: GENERAL RADIOLOGY | Facility: CLINIC | Age: 25
End: 2020-01-14
Attending: NURSE PRACTITIONER
Payer: COMMERCIAL

## 2020-01-14 VITALS
OXYGEN SATURATION: 97 % | RESPIRATION RATE: 16 BRPM | BODY MASS INDEX: 23.99 KG/M2 | HEIGHT: 65 IN | DIASTOLIC BLOOD PRESSURE: 60 MMHG | WEIGHT: 144 LBS | SYSTOLIC BLOOD PRESSURE: 102 MMHG | TEMPERATURE: 97.7 F | HEART RATE: 73 BPM

## 2020-01-14 DIAGNOSIS — M79.644 THUMB PAIN, RIGHT: Primary | ICD-10-CM

## 2020-01-14 DIAGNOSIS — M79.644 THUMB PAIN, RIGHT: ICD-10-CM

## 2020-01-14 PROCEDURE — 99213 OFFICE O/P EST LOW 20 MIN: CPT | Performed by: NURSE PRACTITIONER

## 2020-01-14 PROCEDURE — 73140 X-RAY EXAM OF FINGER(S): CPT | Mod: RT

## 2020-01-14 ASSESSMENT — MIFFLIN-ST. JEOR: SCORE: 1395.09

## 2020-01-14 NOTE — PROGRESS NOTES
"Subjective   Lynne Cortez is a 24 year old female who presents to clinic today for the following health issues:    HPI   Finger Pain    Onset: couple weeks     Description:   Location: right thumb   Character: Sharp and Dull ache    Intensity: moderate    Progression of Symptoms: same    Accompanying Signs & Symptoms:  Other symptoms: swelling    History:   Previous similar pain: no       Precipitating factors:   Trauma or overuse: YES- broom ball stick hit her thumb    Alleviating factors:  Improved by: rest/inactivity, ice and immobilization    Therapies Tried and outcome: ice helps     Pain not improving but not worsening.  Swelling improved.  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  No pain at rest, only hurts with resisted movement such as opening door.    Reviewed and updated as needed this visit by Provider       Review of Systems   Otherwise ROS is negative except as stated above.      Objective    /60 (BP Location: Right arm, Patient Position: Chair, Cuff Size: Adult Regular)   Pulse 73   Temp 97.7  F (36.5  C) (Tympanic)   Resp 16   Ht 1.645 m (5' 4.75\")   Wt 65.3 kg (144 lb)   SpO2 97%   BMI 24.15 kg/m    Body mass index is 24.15 kg/m .  Physical Exam   GENERAL: healthy, alert and no distress  MS: RIGHT THUMB:  No obvious deformity, normal range of motion but some pain weakness noted with resisted flexion/extension, no edema and peripheral pulses normal,tender to palpation at metacarpal base  SKIN: no bruising or redness of joint  NEURO: sensory exam grossly normal    Diagnostic Test Results:  Labs reviewed in Epic  Xray - my personal interpretation is normal      Assessment & Plan   1. Thumb pain, right  No acute fracture. Suspect soft tissue injury. Continue with RICE. Follow-up if pain not improving over the next 2-3 weeks.  - XR Finger Right G/E 2 Views; Future       Patient Instructions "   Continue to ice the area.    If very bothersome you can try ibuprofen.    I will call you with the final radiologist report.      Return in about 2 weeks (around 1/28/2020), or if symptoms worsen or fail to improve.    Breanna Young NP  [facility]

## 2020-01-14 NOTE — PATIENT INSTRUCTIONS
Continue to ice the area.    If very bothersome you can try ibuprofen.    I will call you with the final radiologist report.

## 2020-02-23 ENCOUNTER — NURSE TRIAGE (OUTPATIENT)
Dept: NURSING | Facility: CLINIC | Age: 25
End: 2020-02-23

## 2020-02-23 NOTE — TELEPHONE ENCOUNTER
She will go to urgent care today.  Eileen Alba RN-[facility]      Reason for Disposition    [1] Sore throat is the only symptom AND [2] present > 48 hours    Additional Information    Negative: [1] Negative throat culture or rapid strep test (according to lab, PCP, caller, etc.) AND [2]  symptoms worse    Negative: [1] Diagnosed strep throat AND [2] taking antibiotic AND [3] symptoms continue    Negative: Patient sounds very sick or weak to the triager    Negative: [1] Positive throat culture or rapid strep test (according to lab, PCP, caller, etc.) AND [2] NO standing order to call in prescription for antibiotic    Negative: [1] Positive throat culture or rapid strep test (according to lab, PCP, caller, etc.) AND [2] standing order to call in prescription for antibiotic    Negative: [1] Rapid strep test negative AND [2] symptoms not worse    Negative: [1] Throat culture negative AND [2] symptoms not worse    Negative: Severe difficulty breathing (e.g., struggling for each breath, speaks in single words, stridor)    Negative: Sounds like a life-threatening emergency to the triager    Negative: [1] Diagnosed strep throat AND [2] taking antibiotic AND [3] symptoms continue    Negative: Throat culture results, call about    Negative: Productive cough is main symptom    Negative: Non-productive cough is main symptom    Negative: Hoarseness is main symptom    Negative: Runny nose is main symptom    Negative: [1] Drooling or spitting out saliva (because can't swallow) AND [2] normal breathing    Negative: Unable to open mouth completely    Negative: [1] Difficulty breathing AND [2] not severe    Negative: Fever > 104 F (40 C)    Negative: [1] Refuses to drink anything AND [2] for > 12 hours    Negative: [1] Drinking very little AND [2] dehydration suspected (e.g., no urine > 12 hours, very dry mouth, very lightheaded)    Negative: Patient sounds very sick or weak to the triager    Negative: SEVERE  "(e.g., excruciating) throat pain     Pain at 7    Negative: [1] Pus on tonsils (back of throat) AND [2]  fever AND [3] swollen neck lymph nodes (\"glands\")    Negative: [1] Rash AND [2] widespread (especially chest and abdomen)    Negative: Earache also present    Negative: Fever present > 3 days (72 hours)    Negative: Diabetes mellitus or weak immune system (e.g., HIV positive, cancer chemo, splenectomy, organ transplant)    Negative: History of rheumatic fever    Negative: [1] Adult is leaving on a trip AND [2] requests an antibiotic NOW    Negative: [1] Positive throat culture or rapid strep test (according to lab, PCP, caller, etc.) AND [2] NO  standing order to call in prescription for antibiotic    Negative: [1] Exposure to family member (or spouse or boyfriend/girlfriend) with test-proven strep AND [2] within last 10 days    Protocols used: SORE THROAT-A-, STREP THROAT TEST FOLLOW-UP CALL-A-      "

## 2021-01-15 ENCOUNTER — HEALTH MAINTENANCE LETTER (OUTPATIENT)
Age: 26
End: 2021-01-15

## 2021-09-05 ENCOUNTER — HEALTH MAINTENANCE LETTER (OUTPATIENT)
Age: 26
End: 2021-09-05

## 2022-02-19 ENCOUNTER — HEALTH MAINTENANCE LETTER (OUTPATIENT)
Age: 27
End: 2022-02-19

## 2022-08-11 ENCOUNTER — VIRTUAL VISIT (OUTPATIENT)
Dept: INTERNAL MEDICINE | Facility: CLINIC | Age: 27
End: 2022-08-11
Payer: COMMERCIAL

## 2022-08-11 VITALS — WEIGHT: 140 LBS | BODY MASS INDEX: 23.9 KG/M2 | HEIGHT: 64 IN

## 2022-08-11 DIAGNOSIS — F98.8 ATTENTION DEFICIT DISORDER, UNSPECIFIED HYPERACTIVITY PRESENCE: Primary | ICD-10-CM

## 2022-08-11 PROCEDURE — 99213 OFFICE O/P EST LOW 20 MIN: CPT | Mod: 95 | Performed by: NURSE PRACTITIONER

## 2022-08-11 NOTE — PROGRESS NOTES
Lynne is a 27 year old who is being evaluated via a billable video visit.      How would you like to obtain your AVS? MyChart  If the video visit is dropped, the invitation should be resent by: Text to cell phone: [phone]  Will anyone else be joining your video visit? No      Assessment & Plan     Attention deficit disorder, unspecified hyperactivity presence  She was on adderall as a high schooler - has been off for several years and wants to restart as she is now doing more book keeping work and needs to concentrate more   She feels the lack of concentration when she has to do her work     Discussed signing CSA  And urine tox needed for prescription - she plans to see Anayeli Osorio NP in a couple week and wants to wait to do with her          22 minutes spent on the date of the encounter doing chart review, history and exam, documentation and further activities per the note       Patient Instructions   When you see Anayeli Osorio NP   Controlled substance agreement and urine tox screen will be needed to start adderall   We discussed start at Adderall 5 mg twice daily       Return in about 27 days (around 9/7/2022) for Routine preventive.    ALTHEA Saavedra CNP  [facility]   Lynne is a 27 year old, presenting for the following health issues:  Establish Care and Attention Deficit Disorder    Patient advised pap due.    HPI       How many servings of fruits and vegetables do you eat daily?  2-3    On average, how many sweetened beverages do you drink each day (Examples: soda, juice, sweet tea, etc.  Do NOT count diet or artificially sweetened beverages)?   1    How many days per week do you exercise enough to make your heart beat faster? 4    How many minutes a day do you exercise enough to make your heart beat faster? 20 - 29    How many days per week do you miss taking your medication? 0    On adderall -in high school took twice daily 10-12 grade  None since  2014  Testing was done prior to start  She was a  - had to stop because of reaction to products   Now doing business part of it   Plans to establish care with Anayeli Devon SALAZAR 9/7/2022 and do preventative care   Can sign CSA with her and do urine tox at that time     Adderall 5 mg twice daily   [facility]    Review of Systems   Constitutional, HEENT, cardiovascular, pulmonary, GI, , musculoskeletal, neuro, skin, endocrine and psych systems are negative, except as otherwise noted.      Objective           Vitals:  No vitals were obtained today due to virtual visit.    Physical Exam   GENERAL: alert and no distress  EYES: Eyes grossly normal to inspection.  No discharge or erythema, or obvious scleral/conjunctival abnormalities.  RESP: No audible wheeze, cough, or visible cyanosis.  No visible retractions or increased work of breathing.    SKIN: Visible skin clear. No significant rash, abnormal pigmentation or lesions.  NEURO: Cranial nerves grossly intact.  Mentation and speech appropriate for age.  PSYCH: Mentation appears normal, affect normal/bright, judgement and insight intact, normal speech and appearance well-groomed.                Video-Visit Details    Video Start Time: 4:05 PM  Phone call 1607 NA and no message ability   1649 NA and no message ability   1650  She fell off screen - tried to [id number] and resumed     Type of service:  Video Visit    Video End Time:5:12 PM    Originating Location (pt. Location): Other work/car    Distant Location (provider location):  [facility]     Platform used for Video Visit: Doximity    .  ..

## 2022-08-11 NOTE — PATIENT INSTRUCTIONS
When you see Anayeli Osorio NP   Controlled substance agreement and urine tox screen will be needed to start adderall   We discussed start at Adderall 5 mg twice daily

## 2022-08-11 NOTE — Clinical Note
Hi She is seeing you to establish care and physical. She declined to do urine tox now or any labs done prior to appointment, as she wants to see you first  Told her when she comes to see you to get adderall she needs below  Controlled substance agreement and urine tox screen will be needed to start adderall  We discussed start at Adderall 5 mg twice daily  She seemed very nice Have a good day  [place]

## 2022-09-07 ENCOUNTER — OFFICE VISIT (OUTPATIENT)
Dept: INTERNAL MEDICINE | Facility: CLINIC | Age: 27
End: 2022-09-07
Payer: COMMERCIAL

## 2022-09-07 VITALS
WEIGHT: 141 LBS | HEIGHT: 64 IN | DIASTOLIC BLOOD PRESSURE: 72 MMHG | SYSTOLIC BLOOD PRESSURE: 108 MMHG | TEMPERATURE: 97.4 F | BODY MASS INDEX: 24.07 KG/M2 | HEART RATE: 86 BPM | OXYGEN SATURATION: 99 %

## 2022-09-07 DIAGNOSIS — Z23 NEEDS FLU SHOT: ICD-10-CM

## 2022-09-07 DIAGNOSIS — F98.8 ATTENTION DEFICIT DISORDER, UNSPECIFIED HYPERACTIVITY PRESENCE: Primary | ICD-10-CM

## 2022-09-07 DIAGNOSIS — Z29.9 PREVENTIVE MEASURE: ICD-10-CM

## 2022-09-07 PROCEDURE — 99214 OFFICE O/P EST MOD 30 MIN: CPT | Mod: 25

## 2022-09-07 PROCEDURE — 90686 IIV4 VACC NO PRSV 0.5 ML IM: CPT

## 2022-09-07 PROCEDURE — 90471 IMMUNIZATION ADMIN: CPT

## 2022-09-07 RX ORDER — DEXTROAMPHETAMINE SACCHARATE, AMPHETAMINE ASPARTATE, DEXTROAMPHETAMINE SULFATE AND AMPHETAMINE SULFATE 2.5; 2.5; 2.5; 2.5 MG/1; MG/1; MG/1; MG/1
10 TABLET ORAL DAILY
Qty: 30 TABLET | Refills: 0 | Status: SHIPPED | OUTPATIENT
Start: 2022-10-08 | End: 2022-11-07

## 2022-09-07 RX ORDER — DEXTROAMPHETAMINE SACCHARATE, AMPHETAMINE ASPARTATE, DEXTROAMPHETAMINE SULFATE AND AMPHETAMINE SULFATE 2.5; 2.5; 2.5; 2.5 MG/1; MG/1; MG/1; MG/1
10 TABLET ORAL DAILY
Qty: 30 TABLET | Refills: 0 | Status: SHIPPED | OUTPATIENT
Start: 2022-11-08 | End: 2022-12-08

## 2022-09-07 RX ORDER — DEXTROAMPHETAMINE SACCHARATE, AMPHETAMINE ASPARTATE, DEXTROAMPHETAMINE SULFATE AND AMPHETAMINE SULFATE 2.5; 2.5; 2.5; 2.5 MG/1; MG/1; MG/1; MG/1
10 TABLET ORAL DAILY
Qty: 30 TABLET | Refills: 0 | Status: SHIPPED | OUTPATIENT
Start: 2022-09-07 | End: 2022-09-14

## 2022-09-07 ASSESSMENT — ENCOUNTER SYMPTOMS
HEMATURIA: 0
PALPITATIONS: 0
CHILLS: 0
ARTHRALGIAS: 0
NERVOUS/ANXIOUS: 0
DYSURIA: 0
BREAST MASS: 0
DIZZINESS: 0
EYE PAIN: 0
NAUSEA: 0
WEAKNESS: 0
DIARRHEA: 0
CONSTIPATION: 0
PARESTHESIAS: 0
SORE THROAT: 0
JOINT SWELLING: 0
MYALGIAS: 0
HEMATOCHEZIA: 0
HEARTBURN: 0
FREQUENCY: 0
SHORTNESS OF BREATH: 0
HEADACHES: 0
FEVER: 0
ABDOMINAL PAIN: 0
COUGH: 0

## 2022-09-07 NOTE — PROGRESS NOTES
"  Assessment & Plan     (F98.8) Attention deficit disorder, unspecified hyperactivity presence  (primary encounter diagnosis)  Comment: She would like to start back on Adderall for hx of ADD. She was on this in high school. She has trouble concentrating at work and feels like her ADD is not under good control. She would like to do trial of Adderall to see if this helps with her symptoms and focus at work. We will start at 10MG and can increase to 20MG if needed.   Plan: amphetamine-dextroamphetamine (ADDERALL) 10 MG         tablet, amphetamine-dextroamphetamine         (ADDERALL) 10 MG tablet,         amphetamine-dextroamphetamine (ADDERALL) 10 MG         tablet         (Z23) Needs flu shot  Plan: INFLUENZA VACCINE IM > 6 MONTHS VALENT IIV4         (AFLURIA/FLUZONE)               No follow-ups on file.    ALTHEA Lorenzo CNP  [facility] is a 27 year old, presenting for the following health issues:       HPI     Review of Systems   CONSTITUTIONAL: NEGATIVE for fever, chills, change in weight  INTEGUMENTARY/SKIN: NEGATIVE for worrisome rashes, moles or lesions  EYES: NEGATIVE for vision changes or irritation  ENT/MOUTH: NEGATIVE for ear, mouth and throat problems  RESP: NEGATIVE for significant cough or SOB  CV: NEGATIVE for chest pain, palpitations or peripheral edema  NEURO: NEGATIVE for weakness, dizziness or paresthesias      Objective    /72   Pulse 86   Temp 97.4  F (36.3  C) (Oral)   Ht 1.626 m (5' 4\")   Wt 64 kg (141 lb)   SpO2 99%   Breastfeeding No   BMI 24.20 kg/m    Body mass index is 24.2 kg/m .      Physical Exam  Constitutional:       General: She is not in acute distress.     Appearance: Normal appearance. She is not ill-appearing, toxic-appearing or diaphoretic.   HENT:      Head: Normocephalic and atraumatic.   Eyes:      Conjunctiva/sclera: Conjunctivae normal.   Cardiovascular:      Rate and Rhythm: Normal rate and regular rhythm.      " Heart sounds: Normal heart sounds.   Pulmonary:      Effort: Pulmonary effort is normal.      Breath sounds: Normal breath sounds.   Skin:     General: Skin is warm and dry.   Neurological:      Mental Status: She is alert and oriented to person, place, and time.   Psychiatric:         Mood and Affect: Mood normal.         Behavior: Behavior normal.         Thought Content: Thought content normal.         Judgment: Judgment normal.

## 2022-09-19 DIAGNOSIS — F98.8 ATTENTION DEFICIT DISORDER, UNSPECIFIED HYPERACTIVITY PRESENCE: Primary | ICD-10-CM

## 2022-09-19 RX ORDER — DEXTROAMPHETAMINE SACCHARATE, AMPHETAMINE ASPARTATE MONOHYDRATE, DEXTROAMPHETAMINE SULFATE AND AMPHETAMINE SULFATE 2.5; 2.5; 2.5; 2.5 MG/1; MG/1; MG/1; MG/1
10 CAPSULE, EXTENDED RELEASE ORAL DAILY
Qty: 30 CAPSULE | Refills: 0 | Status: SHIPPED | OUTPATIENT
Start: 2022-11-20 | End: 2022-12-20

## 2022-09-19 RX ORDER — DEXTROAMPHETAMINE SACCHARATE, AMPHETAMINE ASPARTATE MONOHYDRATE, DEXTROAMPHETAMINE SULFATE AND AMPHETAMINE SULFATE 2.5; 2.5; 2.5; 2.5 MG/1; MG/1; MG/1; MG/1
10 CAPSULE, EXTENDED RELEASE ORAL DAILY
Qty: 30 CAPSULE | Refills: 0 | Status: SHIPPED | OUTPATIENT
Start: 2022-10-20 | End: 2022-11-19

## 2022-09-19 RX ORDER — DEXTROAMPHETAMINE SACCHARATE, AMPHETAMINE ASPARTATE MONOHYDRATE, DEXTROAMPHETAMINE SULFATE AND AMPHETAMINE SULFATE 2.5; 2.5; 2.5; 2.5 MG/1; MG/1; MG/1; MG/1
10 CAPSULE, EXTENDED RELEASE ORAL DAILY
Qty: 30 CAPSULE | Refills: 0 | Status: SHIPPED | OUTPATIENT
Start: 2022-09-19 | End: 2022-10-19

## 2022-10-14 ENCOUNTER — OFFICE VISIT (OUTPATIENT)
Dept: URGENT CARE | Facility: URGENT CARE | Age: 27
End: 2022-10-14
Payer: COMMERCIAL

## 2022-10-14 ENCOUNTER — NURSE TRIAGE (OUTPATIENT)
Dept: NURSING | Facility: CLINIC | Age: 27
End: 2022-10-14

## 2022-10-14 VITALS
SYSTOLIC BLOOD PRESSURE: 131 MMHG | HEART RATE: 78 BPM | DIASTOLIC BLOOD PRESSURE: 78 MMHG | OXYGEN SATURATION: 98 % | TEMPERATURE: 98 F | RESPIRATION RATE: 20 BRPM

## 2022-10-14 DIAGNOSIS — N76.0 BV (BACTERIAL VAGINOSIS): ICD-10-CM

## 2022-10-14 DIAGNOSIS — N30.00 ACUTE CYSTITIS WITHOUT HEMATURIA: Primary | ICD-10-CM

## 2022-10-14 DIAGNOSIS — B37.31 YEAST INFECTION OF THE VAGINA: ICD-10-CM

## 2022-10-14 DIAGNOSIS — B96.89 BV (BACTERIAL VAGINOSIS): ICD-10-CM

## 2022-10-14 LAB
ALBUMIN UR-MCNC: NEGATIVE MG/DL
APPEARANCE UR: CLEAR
BACTERIA #/AREA URNS HPF: ABNORMAL /HPF
BILIRUB UR QL STRIP: NEGATIVE
CLUE CELLS: PRESENT
COLOR UR AUTO: YELLOW
GLUCOSE UR STRIP-MCNC: NEGATIVE MG/DL
HGB UR QL STRIP: ABNORMAL
KETONES UR STRIP-MCNC: NEGATIVE MG/DL
LEUKOCYTE ESTERASE UR QL STRIP: ABNORMAL
NITRATE UR QL: NEGATIVE
PH UR STRIP: 6.5 [PH] (ref 5–7)
RBC #/AREA URNS AUTO: ABNORMAL /HPF
SP GR UR STRIP: 1.01 (ref 1–1.03)
SQUAMOUS #/AREA URNS AUTO: ABNORMAL /LPF
TRICHOMONAS, WET PREP: ABNORMAL
UROBILINOGEN UR STRIP-ACNC: 0.2 E.U./DL
WBC #/AREA URNS AUTO: ABNORMAL /HPF
WBC'S/HIGH POWER FIELD, WET PREP: ABNORMAL
YEAST, WET PREP: PRESENT

## 2022-10-14 PROCEDURE — 99213 OFFICE O/P EST LOW 20 MIN: CPT | Performed by: PHYSICIAN ASSISTANT

## 2022-10-14 PROCEDURE — 81001 URINALYSIS AUTO W/SCOPE: CPT | Performed by: PHYSICIAN ASSISTANT

## 2022-10-14 PROCEDURE — 87210 SMEAR WET MOUNT SALINE/INK: CPT | Performed by: PHYSICIAN ASSISTANT

## 2022-10-14 PROCEDURE — 87086 URINE CULTURE/COLONY COUNT: CPT | Performed by: PHYSICIAN ASSISTANT

## 2022-10-14 RX ORDER — NITROFURANTOIN 25; 75 MG/1; MG/1
100 CAPSULE ORAL 2 TIMES DAILY
Qty: 10 CAPSULE | Refills: 0 | Status: SHIPPED | OUTPATIENT
Start: 2022-10-14 | End: 2022-10-19

## 2022-10-14 RX ORDER — FLUCONAZOLE 150 MG/1
150 TABLET ORAL ONCE
Qty: 1 TABLET | Refills: 0 | Status: SHIPPED | OUTPATIENT
Start: 2022-10-14 | End: 2022-10-14

## 2022-10-14 RX ORDER — METRONIDAZOLE 7.5 MG/G
1 GEL VAGINAL AT BEDTIME
Qty: 70 G | Refills: 0 | Status: SHIPPED | OUTPATIENT
Start: 2022-10-14 | End: 2022-10-19

## 2022-10-14 NOTE — TELEPHONE ENCOUNTER
Pt reports new onset urinary symptoms upon waking today.  Burning pain with urinating.  Frequency/urgency.  No fevers or flank pain.    Pt agrees to same-day clinical eval.  No feasible open appt slots per checking with a .  Pt therefore intends to seek urgent care eval ([facility] location).  Hours and location confirmed.    Zoe TAVERAS Health Nurse Advisor     Reason for Disposition    Pain or burning with passing urine (urination) and female    Painful urination AND EITHER frequency or urgency    Additional Information    Negative: Shock suspected (e.g., cold/pale/clammy skin, too weak to stand, low BP, rapid pulse)    Negative: Sounds like a life-threatening emergency to the triager    Negative: Followed a female genital area injury (e.g., vagina, vulva)    Negative: Vaginal discharge    Negative: Shock suspected (e.g., cold/pale/clammy skin, too weak to stand, low BP, rapid pulse)    Negative: Sounds like a life-threatening emergency to the triager    Negative: Unable to urinate (or only a few drops) and bladder feels very full    Negative: Vomiting    Negative: Patient sounds very sick or weak to the triager    Negative: SEVERE pain with urination    Negative: Fever > 100.4 F (38.0 C)    Negative: Side (flank) or lower back pain present    Negative: Taking antibiotic > 24 hours for UTI and fever persists    Negative: Taking antibiotic > 3 days for UTI and painful urination not improved    Negative: Unusual vaginal discharge    Negative: > 2 UTIs in last year    Negative: Patient is worried they have a sexually transmitted infection (STI)    Negative: Age > 50 years    Negative: Possibility of pregnancy    Protocols used: URINARY SYMPTOMS-A-OH, URINATION PAIN - FEMALE-A-OH

## 2022-10-14 NOTE — PROGRESS NOTES
Assessment & Plan     1. Acute cystitis without hematuria  UA is grossly +  No evidence of pyelonephritis, urosepsis or nephrolithiasis  Treatment with macrobid  Fluids  S&S of pyelonephritis discussed and urgent follow-up   - UA reflex to Microscopic and Culture  - Wet preparation  - Urine Microscopic Exam  - Urine Culture  - nitroFURantoin macrocrystal-monohydrate (MACROBID) 100 MG capsule; Take 1 capsule (100 mg) by mouth 2 times daily for 5 days  Dispense: 10 capsule; Refill: 0    2. BV (bacterial vaginosis)  Treatment as below, refrain from intercourse for one week  - metroNIDAZOLE (METROGEL) 0.75 % vaginal gel; Place 1 applicator (5 g) vaginally At Bedtime for 5 days  Dispense: 70 g; Refill: 0    3. Yeast infection of the vagina  Treatment as below  - fluconazole (DIFLUCAN) 150 MG tablet; Take 1 tablet (150 mg) by mouth once for 1 dose  Dispense: 1 tablet; Refill: 0          Return in about 2 days (around 10/16/2022), or if symptoms worsen or fail to improve.    Diagnosis and treatment plan was reviewed with patient and/or family.   We went over any labs or imaging. Discussed worsening symptoms or little to no relief despite treatment plan to follow-up with PCP or return to clinic.  Patient verbalizes understanding. All questions were addressed and answered.     Tammy Rodríguez PA-C  [facility]    CHIEF COMPLAINT:   Chief Complaint   Patient presents with     UTI     Poss UTI and blood in urine     Subjective     Lynne is a 27 year old female who presents to clinic today for evaluation of dysuria, started this AM. Has had to urinate every 15 minutes. Suprapubic pressure.. Denies having fever, chills, flank pain, nausea, vomiting, hematuria or weakness.  Vaginal discharge, itching or pain are not present.   Not currently pregnant.     Past Medical History:   Diagnosis Date     Attention deficit disorder (ADD) without hyperactivity      Past Surgical History:   Procedure Laterality  Date     C LEVONORGESTREL-RELEASE INTRAUTERINE CONTRACEPTIVE (SAIDA), 13.5 MG  07/19/2017    remove by 7/19/20     Social History     Tobacco Use     Smoking status: Never     Smokeless tobacco: Never   Substance Use Topics     Alcohol use: Yes     Alcohol/week: 0.0 standard drinks     Comment: 2 times per month, 5-6 per time     Current Outpatient Medications   Medication     amphetamine-dextroamphetamine (ADDERALL XR) 10 MG 24 hr capsule     [START ON 10/20/2022] amphetamine-dextroamphetamine (ADDERALL XR) 10 MG 24 hr capsule     [START ON 11/20/2022] amphetamine-dextroamphetamine (ADDERALL XR) 10 MG 24 hr capsule     amphetamine-dextroamphetamine (ADDERALL) 10 MG tablet     amphetamine-dextroamphetamine (ADDERALL) 10 MG tablet     [START ON 11/8/2022] amphetamine-dextroamphetamine (ADDERALL) 10 MG tablet     fluconazole (DIFLUCAN) 150 MG tablet     levonorgestrel (MIRENA) 20 MCG/DAY IUD     metroNIDAZOLE (METROGEL) 0.75 % vaginal gel     nitroFURantoin macrocrystal-monohydrate (MACROBID) 100 MG capsule     No current facility-administered medications for this visit.     Allergies   Allergen Reactions     Gold      Confirmed with patch test.      Other [No Clinical Screening - See Comments]      Hair dye and the ingredients.          10 point ROS of systems were all negative except for pertinent positives noted in my HPI.      Exam:   /78   Pulse 78   Temp 98  F (36.7  C)   Resp 20   SpO2 98%   Constitutional: healthy, alert and no distress  ENT: MMM  Cardiovascular: RRR  Respiratory: CTA bilaterally, no rhonchi or rales  Gastrointestinal: soft and nontender  Back: No CVA tenderness B/L  Skin: no rashes    Results for orders placed or performed in visit on 10/14/22   UA reflex to Microscopic and Culture     Status: Abnormal    Specimen: Urine, Clean Catch   Result Value Ref Range    Color Urine Yellow Colorless, Straw, Light Yellow, Yellow    Appearance Urine Clear Clear    Glucose Urine Negative  Negative mg/dL    Bilirubin Urine Negative Negative    Ketones Urine Negative Negative mg/dL    Specific Gravity Urine 1.010 1.003 - 1.035    Blood Urine Moderate (A) Negative    pH Urine 6.5 5.0 - 7.0    Protein Albumin Urine Negative Negative mg/dL    Urobilinogen Urine 0.2 0.2, 1.0 E.U./dL    Nitrite Urine Negative Negative    Leukocyte Esterase Urine Large (A) Negative   Urine Microscopic Exam     Status: Abnormal   Result Value Ref Range    Bacteria Urine Few (A) None Seen /HPF    RBC Urine 5-10 (A) 0-2 /HPF /HPF    WBC Urine 10-25 (A) 0-5 /HPF /HPF    Squamous Epithelials Urine Few (A) None Seen /LPF   Wet preparation     Status: Abnormal    Specimen: Vagina; Swab   Result Value Ref Range    Trichomonas Absent Absent    Yeast Present (A) Absent    Clue Cells Present (A) Absent    WBCs/high power field 2+ (A) None

## 2022-10-16 LAB — BACTERIA UR CULT: NORMAL

## 2022-11-22 ENCOUNTER — MYC REFILL (OUTPATIENT)
Dept: INTERNAL MEDICINE | Facility: CLINIC | Age: 27
End: 2022-11-22

## 2022-11-22 DIAGNOSIS — F98.8 ATTENTION DEFICIT DISORDER, UNSPECIFIED HYPERACTIVITY PRESENCE: ICD-10-CM

## 2022-11-22 RX ORDER — DEXTROAMPHETAMINE SACCHARATE, AMPHETAMINE ASPARTATE MONOHYDRATE, DEXTROAMPHETAMINE SULFATE AND AMPHETAMINE SULFATE 2.5; 2.5; 2.5; 2.5 MG/1; MG/1; MG/1; MG/1
10 CAPSULE, EXTENDED RELEASE ORAL DAILY
Qty: 30 CAPSULE | Refills: 0 | Status: CANCELLED | OUTPATIENT
Start: 2022-11-22

## 2022-11-22 NOTE — TELEPHONE ENCOUNTER
Pending Prescriptions:                       Disp   Refills    amphetamine-dextroamphetamine (ADDERALL XR*30 cap*0        Sig: Take 1 capsule (10 mg) by mouth daily  Routing refill request to provider for review/approval because:  Drug not on the G refill protocol   Next 5 appointments (look out 90 days)      Dec 07, 2022  5:00 PM  (Arrive by 4:40 PM)  Adult Preventative Visit with ALTHEA Bedoya CNP  [facility] [address] [facility] [id number] [phone]

## 2022-11-23 NOTE — TELEPHONE ENCOUNTER
A prescription for Adderall Exar 10 mg was just sent on 11/20/2022, why is it being requested again?

## 2022-12-11 ENCOUNTER — NURSE TRIAGE (OUTPATIENT)
Dept: NURSING | Facility: CLINIC | Age: 27
End: 2022-12-11

## 2022-12-11 NOTE — TELEPHONE ENCOUNTER
Patient calling because pharmacy is out of stock of adderall. Wanting it to be sent somewhere else. Advised the patient to find a pharmacy who has it in stock, then she can let us know and a request can be sent to the provider to send in a new order. Patient is agreeable with plan and verbalizes understanding.     Jos Mckoy RN on 12/11/2022 at 3:53 PM    Reason for Disposition    [1] Caller has NON-URGENT medicine question about med that PCP prescribed AND [2] triager unable to answer question    Additional Information    Negative: [1] Intentional drug overdose AND [2] suicidal thoughts or ideas    Negative: MORE THAN A DOUBLE DOSE of a prescription or over-the-counter (OTC) drug    Negative: [1] DOUBLE DOSE (an extra dose or lesser amount) of prescription drug AND [2] any symptoms (e.g., dizziness, nausea, pain, sleepiness)    Negative: [1] DOUBLE DOSE (an extra dose or lesser amount) of over-the-counter (OTC) drug AND [2] any symptoms (e.g., dizziness, nausea, pain, sleepiness)    Negative: Took another person's prescription drug    Negative: [1] DOUBLE DOSE (an extra dose or lesser amount) of prescription drug AND [2] NO symptoms (Exception: a double dose of antibiotics)    Negative: Diabetes drug error or overdose (e.g., took wrong type of insulin or took extra dose)    Negative: [1] Prescription not at pharmacy AND [2] was prescribed by PCP recently (Exception: triager has access to EMR and prescription is recorded there. Go to Home Care and confirm for pharmacy.)    Negative: [1] Pharmacy calling with prescription question AND [2] triager unable to answer question    Negative: [1] Caller has URGENT medicine question about med that PCP or specialist prescribed AND [2] triager unable to answer question    Negative: Medicine patch causing local rash or itching    Negative: [1] Caller has medicine question about med NOT prescribed by PCP AND [2] triager unable to answer question (e.g., compatibility with  other med, storage)    Negative: Prescription request for new medicine (not a refill)    Protocols used: MEDICATION QUESTION CALL-A-AH

## 2023-04-01 ENCOUNTER — HEALTH MAINTENANCE LETTER (OUTPATIENT)
Age: 28
End: 2023-04-01

## 2024-06-02 ENCOUNTER — HEALTH MAINTENANCE LETTER (OUTPATIENT)
Age: 29
End: 2024-06-02

## 2024-08-02 ENCOUNTER — OFFICE VISIT (OUTPATIENT)
Dept: PEDIATRICS | Facility: CLINIC | Age: 29
End: 2024-08-02
Payer: COMMERCIAL

## 2024-08-02 VITALS
OXYGEN SATURATION: 100 % | HEART RATE: 63 BPM | HEIGHT: 64 IN | DIASTOLIC BLOOD PRESSURE: 76 MMHG | BODY MASS INDEX: 26.19 KG/M2 | RESPIRATION RATE: 18 BRPM | SYSTOLIC BLOOD PRESSURE: 116 MMHG | TEMPERATURE: 98.5 F | WEIGHT: 153.4 LBS

## 2024-08-02 DIAGNOSIS — F98.8 ADD (ATTENTION DEFICIT DISORDER) WITHOUT HYPERACTIVITY: ICD-10-CM

## 2024-08-02 DIAGNOSIS — Z00.00 ROUTINE GENERAL MEDICAL EXAMINATION AT A HEALTH CARE FACILITY: Primary | ICD-10-CM

## 2024-08-02 DIAGNOSIS — Z97.5 IUD (INTRAUTERINE DEVICE) IN PLACE: ICD-10-CM

## 2024-08-02 DIAGNOSIS — Z12.4 CERVICAL CANCER SCREENING: ICD-10-CM

## 2024-08-02 PROBLEM — D24.2 FIBROADENOMA OF BREAST, LEFT: Status: ACTIVE | Noted: 2024-08-02

## 2024-08-02 PROCEDURE — 87624 HPV HI-RISK TYP POOLED RSLT: CPT | Performed by: STUDENT IN AN ORGANIZED HEALTH CARE EDUCATION/TRAINING PROGRAM

## 2024-08-02 PROCEDURE — 99395 PREV VISIT EST AGE 18-39: CPT | Performed by: STUDENT IN AN ORGANIZED HEALTH CARE EDUCATION/TRAINING PROGRAM

## 2024-08-02 PROCEDURE — G0145 SCR C/V CYTO,THINLAYER,RESCR: HCPCS | Performed by: STUDENT IN AN ORGANIZED HEALTH CARE EDUCATION/TRAINING PROGRAM

## 2024-08-02 SDOH — HEALTH STABILITY: PHYSICAL HEALTH: ON AVERAGE, HOW MANY DAYS PER WEEK DO YOU ENGAGE IN MODERATE TO STRENUOUS EXERCISE (LIKE A BRISK WALK)?: 4 DAYS

## 2024-08-02 ASSESSMENT — SOCIAL DETERMINANTS OF HEALTH (SDOH): HOW OFTEN DO YOU GET TOGETHER WITH FRIENDS OR RELATIVES?: MORE THAN THREE TIMES A WEEK

## 2024-08-02 ASSESSMENT — PAIN SCALES - GENERAL: PAINLEVEL: NO PAIN (0)

## 2024-08-02 NOTE — PATIENT INSTRUCTIONS
Patient Education   Preventive Care Advice   Great to meet you!     You will get a message from our pap team one the resuls come back!     Good luck in the golf tournament.  You can get your TDAP and covid shots at most pharmacies (or here)   This is general advice given by our system to help you stay healthy. However, your care team may have specific advice just for you. Please talk to your care team about your preventive care needs.  Nutrition  Eat 5 or more servings of fruits and vegetables each day.  Try wheat bread, brown rice and whole grain pasta (instead of white bread, rice, and pasta).  Get enough calcium and vitamin D. Check the label on foods and aim for 100% of the RDA (recommended daily allowance).  Lifestyle  Exercise at least 150 minutes each week  (30 minutes a day, 5 days a week).  Do muscle strengthening activities 2 days a week. These help control your weight and prevent disease.  No smoking.  Wear sunscreen to prevent skin cancer.  Have a dental exam and cleaning every 6 months.  Yearly exams  See your health care team every year to talk about:  Any changes in your health.  Any medicines your care team has prescribed.  Preventive care, family planning, and ways to prevent chronic diseases.  Shots (vaccines)   HPV shots (up to age 26), if you've never had them before.  Hepatitis B shots (up to age 59), if you've never had them before.  COVID-19 shot: Get this shot when it's due.  Flu shot: Get a flu shot every year.  Tetanus shot: Get a tetanus shot every 10 years.  Pneumococcal, hepatitis A, and RSV shots: Ask your care team if you need these based on your risk.  Shingles shot (for age 50 and up)  General health tests  Diabetes screening:  Starting at age 35, Get screened for diabetes at least every 3 years.  If you are younger than age 35, ask your care team if you should be screened for diabetes.  Cholesterol test: At age 39, start having a cholesterol test every 5 years, or more often if  advised.  Bone density scan (DEXA): At age 50, ask your care team if you should have this scan for osteoporosis (brittle bones).  Hepatitis C: Get tested at least once in your life.  STIs (sexually transmitted infections)  Before age 24: Ask your care team if you should be screened for STIs.  After age 24: Get screened for STIs if you're at risk. You are at risk for STIs (including HIV) if:  You are sexually active with more than one person.  You don't use condoms every time.  You or a partner was diagnosed with a sexually transmitted infection.  If you are at risk for HIV, ask about PrEP medicine to prevent HIV.  Get tested for HIV at least once in your life, whether you are at risk for HIV or not.  Cancer screening tests  Cervical cancer screening: If you have a cervix, begin getting regular cervical cancer screening tests starting at age 21.  Breast cancer scan (mammogram): If you've ever had breasts, begin having regular mammograms starting at age 40. This is a scan to check for breast cancer.  Colon cancer screening: It is important to start screening for colon cancer at age 45.  Have a colonoscopy test every 10 years (or more often if you're at risk) Or, ask your provider about stool tests like a FIT test every year or Cologuard test every 3 years.  To learn more about your testing options, visit:   .  For help making a decision, visit:   https://bit.ly/lo46298.  Prostate cancer screening test: If you have a prostate, ask your care team if a prostate cancer screening test (PSA) at age 55 is right for you.  Lung cancer screening: If you are a current or former smoker ages 50 to 80, ask your care team if ongoing lung cancer screenings are right for you.  For informational purposes only. Not to replace the advice of your health care provider. Copyright   2023 [employer]. All rights reserved. Clinically reviewed by the [facility] Transitions Program. Station X [id number] - REV 01/24.

## 2024-08-02 NOTE — PROGRESS NOTES
"Preventive Care Visit  [facility] TRAY Vides MD, Internal Medicine  Aug 2, 2024      Assessment & Plan     Routine general medical examination at a health care facility  Presents today for routine visit    Cervical cancer screening  No prior abnormal pap. Will screen today  - Pap Screen with HPV - Recommended Age 30 - 65 Years    ADD (attention deficit disorder) without hyperactivity  Notes a history of adhd previosuly on adderall.  Notes that she did not like how it made her feel. She doesn't want to resume at this point, but notes that she may in the future.  Would want to start at a low dose if restarting.     IUD (intrauterine device) in place  Placed in 2022.  Notes that she plans to have this removed after her wedding next year            BMI  Estimated body mass index is 26.06 kg/m  as calculated from the following:    Height as of this encounter: 1.634 m (5' 4.33\").    Weight as of this encounter: 69.6 kg (153 lb 6.4 oz).       Counseling  Appropriate preventive services were addressed with this patient via screening, questionnaire, or discussion as appropriate for fall prevention, nutrition, physical activity, Tobacco-use cessation, weight loss and cognition.  Checklist reviewing preventive services available has been given to the patient.  Reviewed patient's diet, addressing concerns and/or questions.   The patient was instructed to see the dentist every 6 months.           Kary Batista is a 29 year old, presenting for the following:  Physical        8/2/2024    11:19 AM   Additional Questions   Roomed by lo   Accompanied by Self         8/2/2024    11:19 AM   Patient Reported Additional Medications   Patient reports taking the following new medications no        Health Care Directive  Patient does not have a Health Care Directive or Living Will: Discussed advance care planning with patient; however, patient declined at this time.    HPI  Has been off adderral but " wonders whether it might be beneficial in the     Difficulty with maintaining attention'    Noticing at work    Was helpful when taking               8/2/2024   General Health   How would you rate your overall physical health? Good   Feel stress (tense, anxious, or unable to sleep) To some extent      (!) STRESS CONCERN      8/2/2024   Nutrition   Three or more servings of calcium each day? Yes   Diet: Regular (no restrictions)   How many servings of fruit and vegetables per day? (!) 0-1   How many sweetened beverages each day? 0-1            8/2/2024   Exercise   Days per week of moderate/strenous exercise 4 days            8/2/2024   Social Factors   Frequency of gathering with friends or relatives More than three times a week   Worry food won't last until get money to buy more No   Food not last or not have enough money for food? No   Do you have housing? (Housing is defined as stable permanent housing and does not include staying ouside in a car, in a tent, in an abandoned building, in an overnight shelter, or couch-surfing.) Yes   Are you worried about losing your housing? No   Lack of transportation? No   Unable to get utilities (heat,electricity)? No            8/2/2024   Dental   Dentist two times every year? (!) NO            8/2/2024   TB Screening   Were you born outside of the [place]? No            Today's PHQ-2 Score:       8/2/2024    11:10 AM   PHQ-2 ( 1999 [employer])   Q1: Little interest or pleasure in doing things 0   Q2: Feeling down, depressed or hopeless 0   PHQ-2 Score 0   Q1: Little interest or pleasure in doing things Not at all   Q2: Feeling down, depressed or hopeless Not at all   PHQ-2 Score 0           8/2/2024   Substance Use   Alcohol more than 3/day or more than 7/wk No   Do you use any other substances recreationally? No        Social History     Tobacco Use    Smoking status: Never    Smokeless tobacco: Never   Vaping Use    Vaping status: Never Used   Substance Use Topics    Alcohol use:  "Yes     Alcohol/week: 0.0 standard drinks of alcohol     Comment: 2 times per month, 5-6 per time    Drug use: No          Mammogram Screening - Patient under 40 years of age: Routine Mammogram Screening not recommended.           8/2/2024   One time HIV Screening   Previous HIV test? No          8/2/2024   STI Screening   New sexual partner(s) since last STI/HIV test? No        History of abnormal Pap smear: No - age 30- 64 PAP with HPV every 5 years recommended        11/19/2015    12:00 AM   PAP / HPV   PAP (Historical) NIL            8/2/2024   Contraception/Family Planning   Questions about contraception or family planning No           Reviewed and updated as needed this visit by Provider                             Objective    Exam  /76   Pulse 63   Temp 98.5  F (36.9  C) (Oral)   Resp 18   Ht 1.634 m (5' 4.33\")   Wt 69.6 kg (153 lb 6.4 oz)   SpO2 100%   BMI 26.06 kg/m     Estimated body mass index is 26.06 kg/m  as calculated from the following:    Height as of this encounter: 1.634 m (5' 4.33\").    Weight as of this encounter: 69.6 kg (153 lb 6.4 oz).    Physical Exam  GENERAL: alert and no distress  EYES: Eyes grossly normal to inspection, PERRL and conjunctivae and sclerae normal  HENT: ear canals and TM's normal, nose and mouth without ulcers or lesions  NECK: no adenopathy, no asymmetry, masses, or scars  RESP: lungs clear to auscultation - no rales, rhonchi or wheezes  CV: regular rate and rhythm, normal S1 S2, no S3 or S4, no murmur, click or rub, no peripheral edema  ABDOMEN: soft, nontender, no hepatosplenomegaly, no masses and bowel sounds normal   (female): normal female external genitalia, normal urethral meatus, normal vaginal mucosa.  IUD strings visualized.  MS: no gross musculoskeletal defects noted, no edema  SKIN: no suspicious lesions or rashes  NEURO: Normal strength and tone, mentation intact and speech normal  PSYCH: mentation appears normal, affect " normal/bright        Signed Electronically by: Sherley Vides MD

## 2024-08-07 LAB
BKR LAB AP GYN ADEQUACY: NORMAL
BKR LAB AP GYN INTERPRETATION: NORMAL
BKR LAB AP PREVIOUS ABNORMAL: NORMAL
HPV HR 12 DNA CVX QL NAA+PROBE: NEGATIVE
HPV16 DNA CVX QL NAA+PROBE: NEGATIVE
HPV18 DNA CVX QL NAA+PROBE: NEGATIVE
HUMAN PAPILLOMA VIRUS FINAL DIAGNOSIS: NORMAL
PATH REPORT.COMMENTS IMP SPEC: NORMAL
PATH REPORT.COMMENTS IMP SPEC: NORMAL
PATH REPORT.RELEVANT HX SPEC: NORMAL

## 2024-09-10 ENCOUNTER — E-VISIT (OUTPATIENT)
Dept: PEDIATRICS | Facility: CLINIC | Age: 29
End: 2024-09-10
Payer: COMMERCIAL

## 2024-09-10 DIAGNOSIS — F90.0 ATTENTION-DEFICIT HYPERACTIVITY DISORDER, PREDOMINANTLY INATTENTIVE TYPE: ICD-10-CM

## 2024-09-10 DIAGNOSIS — F98.8 ADD (ATTENTION DEFICIT DISORDER) WITHOUT HYPERACTIVITY: Primary | ICD-10-CM

## 2024-09-10 PROCEDURE — 99421 OL DIG E/M SVC 5-10 MIN: CPT | Performed by: STUDENT IN AN ORGANIZED HEALTH CARE EDUCATION/TRAINING PROGRAM

## 2024-09-10 RX ORDER — DEXTROAMPHETAMINE SACCHARATE, AMPHETAMINE ASPARTATE MONOHYDRATE, DEXTROAMPHETAMINE SULFATE AND AMPHETAMINE SULFATE 2.5; 2.5; 2.5; 2.5 MG/1; MG/1; MG/1; MG/1
10 CAPSULE, EXTENDED RELEASE ORAL DAILY
Qty: 30 CAPSULE | Refills: 0 | Status: SHIPPED | OUTPATIENT
Start: 2024-09-10 | End: 2024-10-10

## 2024-09-10 ASSESSMENT — ANXIETY QUESTIONNAIRES
GAD7 TOTAL SCORE: 0
GAD7 TOTAL SCORE: 0
8. IF YOU CHECKED OFF ANY PROBLEMS, HOW DIFFICULT HAVE THESE MADE IT FOR YOU TO DO YOUR WORK, TAKE CARE OF THINGS AT HOME, OR GET ALONG WITH OTHER PEOPLE?: NOT DIFFICULT AT ALL
7. FEELING AFRAID AS IF SOMETHING AWFUL MIGHT HAPPEN: NOT AT ALL
GAD7 TOTAL SCORE: 0

## 2024-09-10 ASSESSMENT — PATIENT HEALTH QUESTIONNAIRE - PHQ9
10. IF YOU CHECKED OFF ANY PROBLEMS, HOW DIFFICULT HAVE THESE PROBLEMS MADE IT FOR YOU TO DO YOUR WORK, TAKE CARE OF THINGS AT HOME, OR GET ALONG WITH OTHER PEOPLE: SOMEWHAT DIFFICULT
SUM OF ALL RESPONSES TO PHQ QUESTIONS 1-9: 1
SUM OF ALL RESPONSES TO PHQ QUESTIONS 1-9: 1

## 2024-09-10 NOTE — PATIENT INSTRUCTIONS
Thank you for doing an evisit! We will start Adderall XR to manage your symptoms.  I have sent a prescription to your pharmacy. This is the usual starting dose (and also looks like it was your old dose looking through your chart). The following medication was sent to your pharmacy:    Orders Placed This Encounter   Medications     amphetamine-dextroamphetamine (ADDERALL XR) 10 MG 24 hr capsule     Sig: Take 1 capsule (10 mg) by mouth daily.     Dispense:  30 capsule     Refill:  0        View your full visit summary for details by clicking on the link below. Your pharmacist will be able to address any questions you may have about the medication.       We can check in person, virtually or via evisit in about 3 weeks to see how this dose is working for you (or sooner if needed).  Let me know what you prefer!      Thank you for choosing us for your care.

## 2024-12-19 ENCOUNTER — OFFICE VISIT (OUTPATIENT)
Dept: INTERNAL MEDICINE | Facility: CLINIC | Age: 29
End: 2024-12-19
Payer: COMMERCIAL

## 2024-12-19 VITALS
OXYGEN SATURATION: 96 % | HEART RATE: 82 BPM | TEMPERATURE: 97.5 F | HEIGHT: 64 IN | WEIGHT: 155 LBS | SYSTOLIC BLOOD PRESSURE: 108 MMHG | BODY MASS INDEX: 26.46 KG/M2 | DIASTOLIC BLOOD PRESSURE: 66 MMHG | RESPIRATION RATE: 14 BRPM

## 2024-12-19 DIAGNOSIS — N89.8 VAGINAL ITCHING: Primary | ICD-10-CM

## 2024-12-19 LAB
CLUE CELLS: ABNORMAL
TRICHOMONAS, WET PREP: ABNORMAL
WBC'S/HIGH POWER FIELD, WET PREP: ABNORMAL
YEAST, WET PREP: PRESENT

## 2024-12-19 RX ORDER — FLUCONAZOLE 150 MG/1
150 TABLET ORAL
Qty: 3 TABLET | Refills: 0 | Status: SHIPPED | OUTPATIENT
Start: 2024-12-19 | End: 2024-12-19

## 2024-12-19 RX ORDER — HYDROCORTISONE 25 MG/G
CREAM TOPICAL 2 TIMES DAILY
Qty: 30 G | Refills: 0 | Status: SHIPPED | OUTPATIENT
Start: 2024-12-19

## 2024-12-19 RX ORDER — FLUCONAZOLE 150 MG/1
150 TABLET ORAL
Qty: 3 TABLET | Refills: 0 | Status: SHIPPED | OUTPATIENT
Start: 2024-12-19

## 2024-12-19 NOTE — NURSING NOTE
"Chief Complaint   Patient presents with    Vaginal Problem     Vaginal irritation on the right side, itchy onset x 1 month     initial /66   Pulse 82   Temp 97.5  F (36.4  C) (Tympanic)   Resp 14   Ht 1.633 m (5' 4.3\")   Wt 70.3 kg (155 lb)   LMP  (LMP Unknown)   SpO2 96%   BMI 26.36 kg/m   Estimated body mass index is 26.36 kg/m  as calculated from the following:    Height as of this encounter: 1.633 m (5' 4.3\").    Weight as of this encounter: 70.3 kg (155 lb)..  bp completed using cuff size regular  ABBIE DOWNING LPN  "

## 2024-12-19 NOTE — PROGRESS NOTES
"  Assessment & Plan     Vaginal itching  Clinical picture suggests yeast infection. Copious amount of white cottage cheese like discharge from vagina. Wet prep confirmed yeast infection. Will treat with diflucan. Can apply hydrocortisone cream topically to help with itch   - Wet prep - Clinic Collect  - fluconazole (DIFLUCAN) 150 MG tablet; Take 1 tablet (150 mg) by mouth every 3 days.  - hydrocortisone 2.5 % cream; Apply topically 2 times daily.      20 minutes spent by me on the date of the encounter doing chart review, review of test results, interpretation of tests, patient visit, and documentation       BMI  Estimated body mass index is 26.36 kg/m  as calculated from the following:    Height as of this encounter: 1.633 m (5' 4.3\").    Weight as of this encounter: 70.3 kg (155 lb).   Weight management plan: Patient was referred to their PCP to discuss a diet and exercise plan.          Kary Batista is a 29 year old, presenting for the following health issues:  Patient is here for a complaint of right side perineum itchiness for the last month.   She states there is an area that is extra itchy on the right side.   No concerns for STD.  No urinary symptoms.   No new sex partners       Vaginal Problem (Vaginal irritation on the right side, itchy onset x 1 month)      12/19/2024     7:46 AM   Additional Questions   Roomed by Kaylen FOUNTAIN     HPI               Review of Systems  Constitutional, HEENT, cardiovascular, pulmonary, gi and gu systems are negative, except as otherwise noted.      Objective    /66   Pulse 82   Temp 97.5  F (36.4  C) (Tympanic)   Resp 14   Ht 1.633 m (5' 4.3\")   Wt 70.3 kg (155 lb)   LMP  (LMP Unknown)   SpO2 96%   BMI 26.36 kg/m    Body mass index is 26.36 kg/m .  Physical Exam   GENERAL: alert and no distress   (female): normal female external genitalia, vaginal discharge - white and curd-like, and normal cervix, adnexae, and uterus without masses.  MS: no gross " musculoskeletal defects noted, no edema            Signed Electronically by: ALTHEA Vega CNP

## 2025-07-07 ENCOUNTER — PATIENT OUTREACH (OUTPATIENT)
Dept: CARE COORDINATION | Facility: CLINIC | Age: 30
End: 2025-07-07
Payer: COMMERCIAL

## 2025-07-21 ENCOUNTER — PATIENT OUTREACH (OUTPATIENT)
Dept: CARE COORDINATION | Facility: CLINIC | Age: 30
End: 2025-07-21
Payer: COMMERCIAL